# Patient Record
Sex: MALE | Race: WHITE | Employment: OTHER | URBAN - METROPOLITAN AREA
[De-identification: names, ages, dates, MRNs, and addresses within clinical notes are randomized per-mention and may not be internally consistent; named-entity substitution may affect disease eponyms.]

---

## 2018-03-29 ENCOUNTER — HOSPITAL ENCOUNTER (OUTPATIENT)
Dept: PHYSICAL THERAPY | Age: 63
Discharge: HOME | End: 2018-03-30
Admitting: FAMILY MEDICINE
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
## 2018-04-01 ENCOUNTER — HOSPITAL ENCOUNTER (OUTPATIENT)
Dept: OTHER | Age: 63
Discharge: HOME | End: 2018-04-01
Attending: FAMILY MEDICINE | Admitting: FAMILY MEDICINE

## 2018-04-05 ENCOUNTER — HOSPITAL ENCOUNTER (OUTPATIENT)
Dept: PHYSICAL THERAPY | Age: 63
Discharge: HOME | End: 2018-04-06

## 2018-04-10 ENCOUNTER — HOSPITAL ENCOUNTER (OUTPATIENT)
Dept: PHYSICAL THERAPY | Age: 63
Discharge: HOME | End: 2018-04-11

## 2018-04-12 ENCOUNTER — HOSPITAL ENCOUNTER (OUTPATIENT)
Dept: PHYSICAL THERAPY | Age: 63
Discharge: HOME | End: 2018-04-13

## 2018-04-17 ENCOUNTER — HOSPITAL ENCOUNTER (OUTPATIENT)
Dept: PHYSICAL THERAPY | Age: 63
Discharge: HOME | End: 2018-04-30

## 2018-04-24 ENCOUNTER — HOSPITAL ENCOUNTER (OUTPATIENT)
Dept: PHYSICAL THERAPY | Age: 63
Discharge: HOME | End: 2018-04-25

## 2018-04-27 ENCOUNTER — HOSPITAL ENCOUNTER (OUTPATIENT)
Dept: OTHER | Age: 63
Discharge: HOME | End: 2018-04-27
Attending: FAMILY MEDICINE | Admitting: FAMILY MEDICINE

## 2018-04-27 DIAGNOSIS — M54.2 CERVICALGIA: ICD-10-CM

## 2018-05-01 ENCOUNTER — HOSPITAL ENCOUNTER (OUTPATIENT)
Dept: OTHER | Age: 63
Discharge: HOME | End: 2018-05-14
Attending: FAMILY MEDICINE | Admitting: FAMILY MEDICINE

## 2018-05-10 ENCOUNTER — HOSPITAL ENCOUNTER (OUTPATIENT)
Dept: PHYSICAL THERAPY | Age: 63
Discharge: HOME | End: 2018-05-11

## 2018-09-27 ENCOUNTER — HOSPITAL ENCOUNTER (OUTPATIENT)
Age: 63
Discharge: HOME | End: 2018-09-27
Payer: MEDICAID

## 2018-09-27 ENCOUNTER — HOSPITAL ENCOUNTER (OUTPATIENT)
Dept: GENERAL RADIOLOGY | Age: 63
Discharge: HOME | End: 2018-09-27
Payer: MEDICAID

## 2018-09-27 DIAGNOSIS — J44.1 COPD EXACERBATION (HCC): ICD-10-CM

## 2018-09-27 PROCEDURE — 71046 X-RAY EXAM CHEST 2 VIEWS: CPT

## 2018-10-17 ENCOUNTER — OFFICE VISIT (OUTPATIENT)
Dept: ORTHOPEDIC SURGERY | Age: 63
End: 2018-10-17
Payer: MEDICAID

## 2018-10-17 VITALS
HEART RATE: 72 BPM | BODY MASS INDEX: 39.25 KG/M2 | WEIGHT: 264.99 LBS | DIASTOLIC BLOOD PRESSURE: 79 MMHG | SYSTOLIC BLOOD PRESSURE: 137 MMHG | HEIGHT: 69 IN

## 2018-10-17 DIAGNOSIS — M54.2 NECK PAIN: ICD-10-CM

## 2018-10-17 DIAGNOSIS — S16.1XXA STRAIN OF NECK MUSCLE, INITIAL ENCOUNTER: ICD-10-CM

## 2018-10-17 DIAGNOSIS — M54.12 CERVICAL RADICULOPATHY: Primary | ICD-10-CM

## 2018-10-17 PROCEDURE — 3017F COLORECTAL CA SCREEN DOC REV: CPT | Performed by: PHYSICAL MEDICINE & REHABILITATION

## 2018-10-17 PROCEDURE — G8427 DOCREV CUR MEDS BY ELIG CLIN: HCPCS | Performed by: PHYSICAL MEDICINE & REHABILITATION

## 2018-10-17 PROCEDURE — G8417 CALC BMI ABV UP PARAM F/U: HCPCS | Performed by: PHYSICAL MEDICINE & REHABILITATION

## 2018-10-17 PROCEDURE — 99204 OFFICE O/P NEW MOD 45 MIN: CPT | Performed by: PHYSICAL MEDICINE & REHABILITATION

## 2018-10-17 PROCEDURE — G8484 FLU IMMUNIZE NO ADMIN: HCPCS | Performed by: PHYSICAL MEDICINE & REHABILITATION

## 2018-10-17 PROCEDURE — 1036F TOBACCO NON-USER: CPT | Performed by: PHYSICAL MEDICINE & REHABILITATION

## 2018-10-19 ENCOUNTER — APPOINTMENT (OUTPATIENT)
Dept: GENERAL RADIOLOGY | Age: 63
End: 2018-10-19
Payer: MEDICAID

## 2018-10-19 ENCOUNTER — HOSPITAL ENCOUNTER (EMERGENCY)
Age: 63
Discharge: HOME | End: 2018-10-19
Attending: EMERGENCY MEDICINE
Payer: MEDICAID

## 2018-10-19 VITALS
DIASTOLIC BLOOD PRESSURE: 74 MMHG | RESPIRATION RATE: 15 BRPM | SYSTOLIC BLOOD PRESSURE: 157 MMHG | HEIGHT: 70 IN | BODY MASS INDEX: 39.37 KG/M2 | HEART RATE: 101 BPM | TEMPERATURE: 97.1 F | WEIGHT: 275 LBS | OXYGEN SATURATION: 100 %

## 2018-10-19 DIAGNOSIS — R05.9 COUGH: Primary | ICD-10-CM

## 2018-10-19 DIAGNOSIS — J44.1 COPD EXACERBATION (HCC): ICD-10-CM

## 2018-10-19 LAB
A/G RATIO: 1.3 (ref 1.1–2.2)
ALBUMIN SERPL-MCNC: 3.9 G/DL (ref 3.4–5)
ALP BLD-CCNC: 89 U/L (ref 40–129)
ALT SERPL-CCNC: 15 U/L (ref 10–40)
ANION GAP SERPL CALCULATED.3IONS-SCNC: 8 MMOL/L (ref 3–16)
AST SERPL-CCNC: 22 U/L (ref 15–37)
BASOPHILS ABSOLUTE: 0 K/UL (ref 0–0.2)
BASOPHILS RELATIVE PERCENT: 0.7 %
BILIRUB SERPL-MCNC: 0.3 MG/DL (ref 0–1)
BUN BLDV-MCNC: 13 MG/DL (ref 7–20)
CALCIUM SERPL-MCNC: 9 MG/DL (ref 8.3–10.6)
CHLORIDE BLD-SCNC: 100 MMOL/L (ref 99–110)
CO2: 29 MMOL/L (ref 21–32)
CREAT SERPL-MCNC: 0.8 MG/DL (ref 0.8–1.3)
D DIMER: <200 NG/ML DDU (ref 0–229)
EOSINOPHILS ABSOLUTE: 0.3 K/UL (ref 0–0.6)
EOSINOPHILS RELATIVE PERCENT: 5.9 %
GFR AFRICAN AMERICAN: >60
GFR NON-AFRICAN AMERICAN: >60
GLOBULIN: 3 G/DL
GLUCOSE BLD-MCNC: 76 MG/DL (ref 70–99)
HCT VFR BLD CALC: 42.2 % (ref 40.5–52.5)
HEMOGLOBIN: 14.6 G/DL (ref 13.5–17.5)
LYMPHOCYTES ABSOLUTE: 1.1 K/UL (ref 1–5.1)
LYMPHOCYTES RELATIVE PERCENT: 18.3 %
MCH RBC QN AUTO: 30.6 PG (ref 26–34)
MCHC RBC AUTO-ENTMCNC: 34.6 G/DL (ref 31–36)
MCV RBC AUTO: 88.4 FL (ref 80–100)
MONOCYTES ABSOLUTE: 0.4 K/UL (ref 0–1.3)
MONOCYTES RELATIVE PERCENT: 7.6 %
NEUTROPHILS ABSOLUTE: 3.9 K/UL (ref 1.7–7.7)
NEUTROPHILS RELATIVE PERCENT: 67.5 %
PDW BLD-RTO: 13.6 % (ref 12.4–15.4)
PLATELET # BLD: 172 K/UL (ref 135–450)
PMV BLD AUTO: 8.6 FL (ref 5–10.5)
POTASSIUM SERPL-SCNC: 4.2 MMOL/L (ref 3.5–5.1)
PRO-BNP: 153 PG/ML (ref 0–124)
RBC # BLD: 4.77 M/UL (ref 4.2–5.9)
SODIUM BLD-SCNC: 137 MMOL/L (ref 136–145)
TOTAL PROTEIN: 6.9 G/DL (ref 6.4–8.2)
TROPONIN: <0.01 NG/ML
WBC # BLD: 5.8 K/UL (ref 4–11)

## 2018-10-19 PROCEDURE — 71046 X-RAY EXAM CHEST 2 VIEWS: CPT

## 2018-10-19 PROCEDURE — 93005 ELECTROCARDIOGRAM TRACING: CPT | Performed by: NURSE PRACTITIONER

## 2018-10-19 PROCEDURE — 83880 ASSAY OF NATRIURETIC PEPTIDE: CPT

## 2018-10-19 PROCEDURE — 80053 COMPREHEN METABOLIC PANEL: CPT

## 2018-10-19 PROCEDURE — 84484 ASSAY OF TROPONIN QUANT: CPT

## 2018-10-19 PROCEDURE — 6360000002 HC RX W HCPCS: Performed by: NURSE PRACTITIONER

## 2018-10-19 PROCEDURE — 85025 COMPLETE CBC W/AUTO DIFF WBC: CPT

## 2018-10-19 PROCEDURE — 93010 ELECTROCARDIOGRAM REPORT: CPT | Performed by: INTERNAL MEDICINE

## 2018-10-19 PROCEDURE — 99285 EMERGENCY DEPT VISIT HI MDM: CPT

## 2018-10-19 PROCEDURE — 85379 FIBRIN DEGRADATION QUANT: CPT

## 2018-10-19 PROCEDURE — 6370000000 HC RX 637 (ALT 250 FOR IP): Performed by: NURSE PRACTITIONER

## 2018-10-19 RX ORDER — IPRATROPIUM BROMIDE AND ALBUTEROL SULFATE 2.5; .5 MG/3ML; MG/3ML
1 SOLUTION RESPIRATORY (INHALATION) ONCE
Status: COMPLETED | OUTPATIENT
Start: 2018-10-19 | End: 2018-10-19

## 2018-10-19 RX ORDER — ALBUTEROL SULFATE 2.5 MG/3ML
5 SOLUTION RESPIRATORY (INHALATION) ONCE
Status: COMPLETED | OUTPATIENT
Start: 2018-10-19 | End: 2018-10-19

## 2018-10-19 RX ORDER — PREDNISONE 20 MG/1
TABLET ORAL
Qty: 18 TABLET | Refills: 0 | Status: SHIPPED | OUTPATIENT
Start: 2018-10-19 | End: 2018-11-07 | Stop reason: ALTCHOICE

## 2018-10-19 RX ORDER — BENZONATATE 100 MG/1
100 CAPSULE ORAL 3 TIMES DAILY PRN
Qty: 30 CAPSULE | Refills: 0 | Status: SHIPPED | OUTPATIENT
Start: 2018-10-19 | End: 2018-10-26

## 2018-10-19 RX ORDER — PREDNISONE 20 MG/1
60 TABLET ORAL ONCE
Status: COMPLETED | OUTPATIENT
Start: 2018-10-19 | End: 2018-10-19

## 2018-10-19 RX ADMIN — ALBUTEROL SULFATE 5 MG: 2.5 SOLUTION RESPIRATORY (INHALATION) at 16:31

## 2018-10-19 RX ADMIN — IPRATROPIUM BROMIDE AND ALBUTEROL SULFATE 1 AMPULE: .5; 3 SOLUTION RESPIRATORY (INHALATION) at 15:48

## 2018-10-19 RX ADMIN — PREDNISONE 60 MG: 20 TABLET ORAL at 15:48

## 2018-10-19 ASSESSMENT — ENCOUNTER SYMPTOMS
SHORTNESS OF BREATH: 1
ABDOMINAL PAIN: 0
COUGH: 1

## 2018-10-19 NOTE — ED PROVIDER NOTES
[address] ED  eMERGENCY dEPARTMENT eNCOUnter        Pt Name: Rachel Bravo  MRN: [id number]  [place] 1955  Date of evaluation: 10/19/2018  Provider: AKILA Pruitt CNP  PCP: AKILA Joe CNP  ED Attendin[address] [place] [address]       Chief Complaint   Patient presents with    Shortness of Breath     Pt presents with c/o shortness of breath that has gotten progressively worse over 2-3 days. Notes hx of COPD. Due to see pulmonologist on . Notes cough x 1 month. Denies chest pain. Note inhaler is not helping sob. HISTORY OF PRESENT ILLNESS   (Location/Symptom, Timing/Onset, Context/Setting, Quality, Duration, Modifying Factors, Severity)  Note limiting factors. Rachel Bravo is a 59 y.o. male for Shortness of breath. Onset was the last few months. Durationhas been progressively getting worse over the last few days. Context includes patient reports that he has had a cough for the last month. He reports that he's had increasing shortness of breath over the last few days. He is scheduled to see pulmonology, Dr. Kei Doe on 2018. Alleviating factors include nothing. Aggravating factors include nothing. Pain is 0/10. Nothing has been used for pain today. Nursing Notes were all reviewed and agreed with or any disagreements were addressed  in the HPI. REVIEW OF SYSTEMS  (2-9 systems for level 4, 10 or more for level 5)     Review of Systems   Constitutional: Negative for fever. Respiratory: Positive for cough and shortness of breath. Cardiovascular: Negative for chest pain. Gastrointestinal: Negative for abdominal pain. Genitourinary: Negative for difficulty urinating. All other systems reviewed and are negative. Positivesand Pertinent negatives as per HPI. Except as noted above in the ROS, all other systems were reviewed and negative.        PAST MEDICAL HISTORY     Past Medical History:   Diagnosis Date BP: (!) 153/85  (!) 150/72 (!) 157/74   Pulse: 69 69 69 101   Resp: 16 17 19 15   Temp:       TempSrc:       SpO2: 98% 100% 100% 100%   Weight:       Height:           Patient was given the following medications:  Medications   ipratropium-albuterol (DUONEB) nebulizer solution 1 ampule (1 ampule Inhalation Given 10/19/18 1548)   predniSONE (DELTASONE) tablet 60 mg (60 mg Oral Given 10/19/18 1548)   albuterol (PROVENTIL) nebulizer solution 5 mg (5 mg Nebulization Given 10/19/18 1631)       Patient was seen and evaluated by myself. Patient here for complaints of shortness of breath. He also states he's had a cough. On exam he is awake and alert hemodynamically stable nontoxic in appearance. Patient does report that he has a history of COPD and uses an inhaler at home. Patient reports he is not on any antibiotics or steroids currently. He does have an appointment scheduled with the pulmonologist in the month. Lab values available been reviewed and interpreted. Patient's chest x-ray was stable for fibrosis and atelectasis with no acute congestive heart failure. His troponin was negative as was d-dimer. Patient was given breathing treatments and steroids in the ED. He was able to ambulate to the restroom without difficulty. Patient will be discharged home with prednisone and encouraged to continue using his albuterol. He was encouraged to continue with his appointment scheduled for next month with the pulmonologist.  I did discuss the case with Dr. Slime Velasquez  however he did not to the patient he was in agreement with the plan. Patient was encouraged follow-up with his primary care doctor in the next few days. Patient was discharged home with all questions answered. The patient tolerated their visit well. They were seen and evaluated by the Bhavik Mesa who agreed with the assessment and plan.   The patient and / or the family were informed of the results of any tests, a time was given to answer questions, a plan was proposed and they agreed withplan. FINAL IMPRESSION      1. Cough    2. COPD exacerbation [facility])          DISPOSITION/PLAN   DISPOSITION Decision To Discharge 10/19/2018 05:17:51 PM      PATIENT REFERRED TO:  AKILA Greenwood CNP  [address]  [phone]    Schedule an appointment as soon as possible for a visit   for re-evaluation    [facility]  [address] [facility]  [phone]    If symptoms worsen    keep your appointment with Dr Miko Warner:  New Prescriptions    BENZONATATE (TESSALON PERLES) 100 MG CAPSULE    Take 1 capsule by mouth 3 times daily as needed for Cough    PREDNISONE (DELTASONE) 20 MG TABLET    Take 3 tabs orally daily for 3 days, then take 2 tabs orally for 3 days then take 1 tab orally for 3 days.        DISCONTINUED MEDICATIONS:  Discontinued Medications    No medications on file              (Please note that portions of this note were completed with a voice recognition program.  Efforts were made to edit the dictations but occasionally words are mis-transcribed.)    AKILA Ruiz CNP (electronically signed)     AKILA Ruiz CNP  10/19/18 4820

## 2018-10-22 LAB
EKG ATRIAL RATE: 82 BPM
EKG DIAGNOSIS: NORMAL
EKG P AXIS: 54 DEGREES
EKG P-R INTERVAL: 146 MS
EKG Q-T INTERVAL: 360 MS
EKG QRS DURATION: 102 MS
EKG QTC CALCULATION (BAZETT): 420 MS
EKG R AXIS: -25 DEGREES
EKG T AXIS: 64 DEGREES
EKG VENTRICULAR RATE: 82 BPM

## 2018-11-07 ENCOUNTER — OFFICE VISIT (OUTPATIENT)
Dept: PULMONOLOGY | Age: 63
End: 2018-11-07
Payer: MEDICAID

## 2018-11-07 VITALS
WEIGHT: 278 LBS | TEMPERATURE: 98.3 F | BODY MASS INDEX: 38.92 KG/M2 | HEIGHT: 71 IN | OXYGEN SATURATION: 97 % | RESPIRATION RATE: 16 BRPM | HEART RATE: 66 BPM | SYSTOLIC BLOOD PRESSURE: 122 MMHG | DIASTOLIC BLOOD PRESSURE: 70 MMHG

## 2018-11-07 DIAGNOSIS — G47.10 HYPERSOMNIA: ICD-10-CM

## 2018-11-07 DIAGNOSIS — R06.02 SOB (SHORTNESS OF BREATH): Primary | ICD-10-CM

## 2018-11-07 DIAGNOSIS — E66.01 MORBID OBESITY WITH BMI OF 40.0-44.9, ADULT (HCC): ICD-10-CM

## 2018-11-07 PROCEDURE — 99245 OFF/OP CONSLTJ NEW/EST HI 55: CPT | Performed by: INTERNAL MEDICINE

## 2018-11-07 PROCEDURE — G8417 CALC BMI ABV UP PARAM F/U: HCPCS | Performed by: INTERNAL MEDICINE

## 2018-11-07 PROCEDURE — G8484 FLU IMMUNIZE NO ADMIN: HCPCS | Performed by: INTERNAL MEDICINE

## 2018-11-07 PROCEDURE — 3017F COLORECTAL CA SCREEN DOC REV: CPT | Performed by: INTERNAL MEDICINE

## 2018-11-07 PROCEDURE — G8428 CUR MEDS NOT DOCUMENT: HCPCS | Performed by: INTERNAL MEDICINE

## 2018-11-07 NOTE — PROGRESS NOTES
Chief Complaint: COPD    Consulting provider: AKILA Holden CNP    HPI: 59 y.o. male patient is being seen at the request of AKILA Holden CNP for a consultation regarding COPD. He does not recall prior testing. He was dx 2 years ago and in the last 6 months the SOB and wheezing have been worse. He finds his rescue inhaler less effective. The patient does not have SOB at rest but has DIAL. Exercise tolerance on level ground is 1-2 blocks. The patient has a  daily intermittent cough that is usually dry. The patient does wheeze intermittently. Recent ED visit for COPD AE. Never hospitalized. He snores and halls asleep very easily during the day. The patient has smoked 1-1.5 PPD for 10 years. Quit in 2001  Environmental/chemical exposure: Asbestos exposure: no  Patient worked with computers. The information above included the review and summarization of old records. The history was obtained from these old records and from the patient directly. Outside information from Ms Boy Ferris NP regarding COPD was reviewed. He has a rescue inhaler. Has not started Symbicort yet. REVIEW OF SYSTEMS:    See HPI and scanned Review of Systems sheet. Past Medical History:   Diagnosis Date    [facility] disease) ([facility] [address]     Histoplasmosis     2000    HLD (hyperlipidemia)     Hypertension      Past Surgical History      Procedure Laterality Date    EYE MUSCLE SURGERY      as child    OTHER SURGICAL HISTORY      PE tubes bilat.  UPPER GASTROINTESTINAL ENDOSCOPY      Approx 9 years ago    UPPER GASTROINTESTINAL ENDOSCOPY  04/29/2012    foreign body     Social History:    TOBACCO:   reports that he quit smoking about 17 years ago. His smoking use included Cigarettes. He has a 30.00 pack-year smoking history. He has never used smokeless tobacco.  ETOH:   reports that he does not drink alcohol. Family History  History reviewed. No pertinent family history.   Allergies:  is allergic to

## 2018-11-19 ENCOUNTER — OFFICE VISIT (OUTPATIENT)
Dept: PULMONOLOGY | Age: 63
End: 2018-11-19
Payer: MEDICAID

## 2018-11-19 VITALS
OXYGEN SATURATION: 96 % | SYSTOLIC BLOOD PRESSURE: 148 MMHG | RESPIRATION RATE: 18 BRPM | BODY MASS INDEX: 38.78 KG/M2 | WEIGHT: 277 LBS | HEIGHT: 71 IN | HEART RATE: 76 BPM | DIASTOLIC BLOOD PRESSURE: 84 MMHG

## 2018-11-19 DIAGNOSIS — R53.83 FATIGUE, UNSPECIFIED TYPE: ICD-10-CM

## 2018-11-19 DIAGNOSIS — G47.10 HYPERSOMNIA: ICD-10-CM

## 2018-11-19 DIAGNOSIS — R29.818 SUSPECTED SLEEP APNEA: ICD-10-CM

## 2018-11-19 DIAGNOSIS — R06.83 SNORING: Primary | ICD-10-CM

## 2018-11-19 PROCEDURE — 99213 OFFICE O/P EST LOW 20 MIN: CPT | Performed by: NURSE PRACTITIONER

## 2018-11-19 PROCEDURE — 1036F TOBACCO NON-USER: CPT | Performed by: NURSE PRACTITIONER

## 2018-11-19 PROCEDURE — G8484 FLU IMMUNIZE NO ADMIN: HCPCS | Performed by: NURSE PRACTITIONER

## 2018-11-19 PROCEDURE — G8417 CALC BMI ABV UP PARAM F/U: HCPCS | Performed by: NURSE PRACTITIONER

## 2018-11-19 PROCEDURE — 3017F COLORECTAL CA SCREEN DOC REV: CPT | Performed by: NURSE PRACTITIONER

## 2018-11-19 PROCEDURE — G8427 DOCREV CUR MEDS BY ELIG CLIN: HCPCS | Performed by: NURSE PRACTITIONER

## 2018-11-19 ASSESSMENT — SLEEP AND FATIGUE QUESTIONNAIRES
HOW LIKELY ARE YOU TO NOD OFF OR FALL ASLEEP WHILE SITTING AND TALKING TO SOMEONE: 2
HOW LIKELY ARE YOU TO NOD OFF OR FALL ASLEEP WHILE LYING DOWN TO REST IN THE AFTERNOON WHEN CIRCUMSTANCES PERMIT: 2
HOW LIKELY ARE YOU TO NOD OFF OR FALL ASLEEP WHEN YOU ARE A PASSENGER IN A CAR FOR AN HOUR WITHOUT A BREAK: 3
HOW LIKELY ARE YOU TO NOD OFF OR FALL ASLEEP WHILE SITTING QUIETLY AFTER LUNCH WITHOUT ALCOHOL: 3
HOW LIKELY ARE YOU TO NOD OFF OR FALL ASLEEP WHILE SITTING INACTIVE IN A PUBLIC PLACE: 2
HOW LIKELY ARE YOU TO NOD OFF OR FALL ASLEEP WHILE SITTING AND READING: 3
HOW LIKELY ARE YOU TO NOD OFF OR FALL ASLEEP IN A CAR, WHILE STOPPED FOR A FEW MINUTES IN TRAFFIC: 2
NECK CIRCUMFERENCE (INCHES): 18.5
HOW LIKELY ARE YOU TO NOD OFF OR FALL ASLEEP WHILE WATCHING TV: 3
ESS TOTAL SCORE: 20

## 2018-11-19 NOTE — PATIENT INSTRUCTIONS
Please keep all of your future appointments scheduled by [address], [facility] Pulmonary office. Sleep Hygiene. .. Tips for better sleep. .. Avoid naps. This will ensure you are sleepy at bedtime. If you have to take a nap, sleep less than 1 hour, before 3 pm.  Sleep only when sleepy; this reduces the time you are awake in bed. Regular exercise is recommended to help you deepen your sleep, but not within 4-6 hours of your bedtime. Timing of exercise is important, aim to exercise early in the morning or early afternoon. A light snack may help you fall asleep. Warm milk and foods high in the amino acid tryptophan, such as bananas, may help you to sleep  Be sure to avoid heavy, spicy or sugary foods 4-6 hours before bedtime and avoid at snack time. Stay away from stimulants such as caffeine and nicotine for at least 4-6 hours before bed. Stimulants can interfere with your ability to fall asleep. Caffeine is found in tea, cola, coffee, cocoa and chocolate and is best avoided at bedtime. Nicotine is found in tobacco products. Avoid alcohol 4-6 hours before bedtime. Alcohol has an immediate sleep-inducing effect, after a few hours when alcohol levels fall there is a stimulant or wake-up effect and will cause fragmented sleep. Sleep rituals are important. Give your body clues it is time to slow down and sleep. Examples include; yoga, deep breathing, listen to relaxing music, a hot bath or a few minutes of reading. Have a fixed bedtime and awakening time, Even on weekends! You will feel better keeping a regular sleep cycle, even if you are retired or not working. Get into your favorite sleep position. If not asleep in 30 minutes, get up and do something boring until you feel sleepy. Remember not to expose yourself to bright lights such as TV, phone or tablet screens. Only use your bed for sleeping. Do not use your bed as an office, workroom or recreation room. Use comfortable bedding. [phone] or  [phone] [address], [address] [place] [address]  ([address]) [address], [address]   [place] [address] [phone] opt4 opt2 [phone]    [facility] [phone] [address], [address] [facility]   [place]  ([address]) [phone] or  [phone] [address], [address]

## 2018-11-19 NOTE — PROGRESS NOTES
[facility]                                                                  CHIEF COMPLAINT: snoring, excessive daytime sleepiness     Consulting provider: Dr. Mishel Mackay       HPI:   Snoring at night for the past 5+ years. The severity of snoring is loud and severe. Snoring is interrupting sleep of others. Wakes self snoring. Has witnessed apnea. Wakes up at night choking and gasping for air. No restorative sleep. No dry mouth upon awakening. Fatigue and tiredness during the day. Bedtime 8-10 pm and rise time is 5-6 am. Sleep onset<5 minutes. No TV in bedroom. 0-1 nocturia. Wakes up 2-3 times at night. It takes him 15 minutes to fall back a sleep. +unintentional naps during the day, dozes off and on multiple times throughout the day 2-8 times, on average for 15-20 minutes. No headache in am. No car wrecks or near wrecks because of the sleepiness. No nodding off while driving. +weight gain of 25 pounds over the last year. +forgetfulness or decreased concentration. +nasal congestion at night. 3-4 (16oz) caffinated beverages per day. No alcohol. +restless feelings in legs at night. ESS is 20. No smoking. No FH for ELPIDIO (+son on CPAP), RLS or narcolepsy.      Sleep Medicine 11/19/2018   Sitting and reading 3   Watching TV 3   Sitting, inactive in a public place (e.g. a theatre or a meeting) 2   As a passenger in a car for an hour without a break 3   Lying down to rest in the afternoon when circumstances permit 2   Sitting and talking to someone 2   Sitting quietly after a lunch without alcohol 3   In a car, while stopped for a few minutes in traffic 2   Total score 20   Neck circumference 18.5       Past Medical History:   Diagnosis Date    [facility] obstructive [facility] disease) ([facility] [address]     Histoplasmosis     2000    HLD (hyperlipidemia)     Hypertension        Past Surgical History:        Procedure Laterality Date    EYE MUSCLE SURGERY      as child    OTHER SURGICAL

## 2018-11-21 ENCOUNTER — HOSPITAL ENCOUNTER (OUTPATIENT)
Dept: PULMONOLOGY | Age: 63
Discharge: HOME | End: 2018-11-21
Payer: MEDICAID

## 2018-11-21 ENCOUNTER — TELEPHONE (OUTPATIENT)
Dept: PULMONOLOGY | Age: 63
End: 2018-11-21

## 2018-11-21 ENCOUNTER — OFFICE VISIT (OUTPATIENT)
Dept: PULMONOLOGY | Age: 63
End: 2018-11-21
Payer: MEDICAID

## 2018-11-21 VITALS
RESPIRATION RATE: 20 BRPM | HEART RATE: 82 BPM | HEIGHT: 70 IN | TEMPERATURE: 97.9 F | WEIGHT: 280 LBS | OXYGEN SATURATION: 95 % | DIASTOLIC BLOOD PRESSURE: 80 MMHG | BODY MASS INDEX: 40.09 KG/M2 | SYSTOLIC BLOOD PRESSURE: 142 MMHG

## 2018-11-21 DIAGNOSIS — J44.9 MODERATE COPD (CHRONIC OBSTRUCTIVE PULMONARY DISEASE) (HCC): Primary | ICD-10-CM

## 2018-11-21 DIAGNOSIS — R06.02 SOB (SHORTNESS OF BREATH): ICD-10-CM

## 2018-11-21 DIAGNOSIS — R29.818 SUSPECTED SLEEP APNEA: ICD-10-CM

## 2018-11-21 DIAGNOSIS — E66.01 MORBID OBESITY WITH BMI OF 40.0-44.9, ADULT (HCC): ICD-10-CM

## 2018-11-21 PROCEDURE — 94726 PLETHYSMOGRAPHY LUNG VOLUMES: CPT

## 2018-11-21 PROCEDURE — 99214 OFFICE O/P EST MOD 30 MIN: CPT | Performed by: INTERNAL MEDICINE

## 2018-11-21 PROCEDURE — 1036F TOBACCO NON-USER: CPT | Performed by: INTERNAL MEDICINE

## 2018-11-21 PROCEDURE — 94618 PULMONARY STRESS TESTING: CPT

## 2018-11-21 PROCEDURE — G8427 DOCREV CUR MEDS BY ELIG CLIN: HCPCS | Performed by: INTERNAL MEDICINE

## 2018-11-21 PROCEDURE — G8926 SPIRO NO PERF OR DOC: HCPCS | Performed by: INTERNAL MEDICINE

## 2018-11-21 PROCEDURE — 3023F SPIROM DOC REV: CPT | Performed by: INTERNAL MEDICINE

## 2018-11-21 PROCEDURE — 3017F COLORECTAL CA SCREEN DOC REV: CPT | Performed by: INTERNAL MEDICINE

## 2018-11-21 PROCEDURE — G8484 FLU IMMUNIZE NO ADMIN: HCPCS | Performed by: INTERNAL MEDICINE

## 2018-11-21 PROCEDURE — 94060 EVALUATION OF WHEEZING: CPT

## 2018-11-21 PROCEDURE — 94640 AIRWAY INHALATION TREATMENT: CPT

## 2018-11-21 PROCEDURE — G8417 CALC BMI ABV UP PARAM F/U: HCPCS | Performed by: INTERNAL MEDICINE

## 2018-11-21 PROCEDURE — 6360000002 HC RX W HCPCS: Performed by: INTERNAL MEDICINE

## 2018-11-21 PROCEDURE — 94729 DIFFUSING CAPACITY: CPT

## 2018-11-21 PROCEDURE — 94664 DEMO&/EVAL PT USE INHALER: CPT

## 2018-11-21 RX ORDER — ALBUTEROL SULFATE 2.5 MG/3ML
2.5 SOLUTION RESPIRATORY (INHALATION) ONCE
Status: COMPLETED | OUTPATIENT
Start: 2018-11-21 | End: 2018-11-21

## 2018-11-21 RX ORDER — ALBUTEROL SULFATE 2.5 MG/3ML
2.5 SOLUTION RESPIRATORY (INHALATION) EVERY 6 HOURS PRN
Qty: 125 EACH | Refills: 5 | Status: SHIPPED | OUTPATIENT
Start: 2018-11-21 | End: 2020-07-21 | Stop reason: SDUPTHER

## 2018-11-21 RX ADMIN — ALBUTEROL SULFATE 2.5 MG: 2.5 SOLUTION RESPIRATORY (INHALATION) at 09:19

## 2018-12-17 ENCOUNTER — HOSPITAL ENCOUNTER (OUTPATIENT)
Dept: SLEEP CENTER | Age: 63
Discharge: HOME | End: 2018-12-19
Payer: MEDICAID

## 2018-12-17 DIAGNOSIS — R53.83 FATIGUE, UNSPECIFIED TYPE: ICD-10-CM

## 2018-12-17 DIAGNOSIS — R06.83 SNORING: ICD-10-CM

## 2018-12-17 DIAGNOSIS — G47.10 HYPERSOMNIA: ICD-10-CM

## 2018-12-17 DIAGNOSIS — R29.818 SUSPECTED SLEEP APNEA: ICD-10-CM

## 2018-12-17 PROCEDURE — 95811 POLYSOM 6/>YRS CPAP 4/> PARM: CPT

## 2018-12-18 ENCOUNTER — TELEPHONE (OUTPATIENT)
Dept: PULMONOLOGY | Age: 63
End: 2018-12-18

## 2018-12-18 DIAGNOSIS — G47.33 OSA (OBSTRUCTIVE SLEEP APNEA): Primary | ICD-10-CM

## 2019-01-02 ENCOUNTER — OFFICE VISIT (OUTPATIENT)
Dept: ORTHOPEDIC SURGERY | Age: 64
End: 2019-01-02
Payer: MEDICAID

## 2019-01-02 VITALS
HEART RATE: 71 BPM | SYSTOLIC BLOOD PRESSURE: 158 MMHG | BODY MASS INDEX: 40.08 KG/M2 | DIASTOLIC BLOOD PRESSURE: 83 MMHG | WEIGHT: 279.98 LBS | HEIGHT: 70 IN

## 2019-01-02 DIAGNOSIS — S16.1XXA STRAIN OF NECK MUSCLE, INITIAL ENCOUNTER: ICD-10-CM

## 2019-01-02 DIAGNOSIS — M54.12 CERVICAL RADICULOPATHY: Primary | ICD-10-CM

## 2019-01-02 DIAGNOSIS — M54.2 NECK PAIN: ICD-10-CM

## 2019-01-02 PROCEDURE — G8417 CALC BMI ABV UP PARAM F/U: HCPCS | Performed by: PHYSICAL MEDICINE & REHABILITATION

## 2019-01-02 PROCEDURE — G8484 FLU IMMUNIZE NO ADMIN: HCPCS | Performed by: PHYSICAL MEDICINE & REHABILITATION

## 2019-01-02 PROCEDURE — 99213 OFFICE O/P EST LOW 20 MIN: CPT | Performed by: PHYSICAL MEDICINE & REHABILITATION

## 2019-01-02 PROCEDURE — 3017F COLORECTAL CA SCREEN DOC REV: CPT | Performed by: PHYSICAL MEDICINE & REHABILITATION

## 2019-01-02 PROCEDURE — 1036F TOBACCO NON-USER: CPT | Performed by: PHYSICAL MEDICINE & REHABILITATION

## 2019-01-02 PROCEDURE — G8427 DOCREV CUR MEDS BY ELIG CLIN: HCPCS | Performed by: PHYSICAL MEDICINE & REHABILITATION

## 2019-01-02 RX ORDER — GABAPENTIN 300 MG/1
300 CAPSULE ORAL 2 TIMES DAILY PRN
Qty: 60 CAPSULE | Refills: 2 | Status: SHIPPED | OUTPATIENT
Start: 2019-01-02 | End: 2020-01-21

## 2019-01-16 ENCOUNTER — HOSPITAL ENCOUNTER (OUTPATIENT)
Dept: VASCULAR LAB | Age: 64
Discharge: HOME | End: 2019-01-16
Payer: MEDICAID

## 2019-01-16 PROCEDURE — 93971 EXTREMITY STUDY: CPT

## 2019-01-28 ENCOUNTER — HOSPITAL ENCOUNTER (OUTPATIENT)
Dept: CARDIAC REHAB | Age: 64
Setting detail: THERAPIES SERIES
Discharge: HOME | End: 2019-01-28
Payer: MEDICAID

## 2019-01-28 PROCEDURE — G0424 PULMONARY REHAB W EXER: HCPCS

## 2019-02-04 ENCOUNTER — HOSPITAL ENCOUNTER (OUTPATIENT)
Dept: CARDIAC REHAB | Age: 64
Setting detail: THERAPIES SERIES
Discharge: HOME | End: 2019-02-04
Payer: MEDICAID

## 2019-02-04 PROCEDURE — G0424 PULMONARY REHAB W EXER: HCPCS

## 2019-02-15 ENCOUNTER — TELEPHONE (OUTPATIENT)
Dept: ORTHOPEDIC SURGERY | Age: 64
End: 2019-02-15

## 2019-03-04 ENCOUNTER — OFFICE VISIT (OUTPATIENT)
Dept: PULMONOLOGY | Age: 64
End: 2019-03-04
Payer: MEDICAID

## 2019-03-04 VITALS
TEMPERATURE: 97.9 F | DIASTOLIC BLOOD PRESSURE: 80 MMHG | WEIGHT: 278 LBS | RESPIRATION RATE: 16 BRPM | HEART RATE: 86 BPM | OXYGEN SATURATION: 96 % | BODY MASS INDEX: 39.8 KG/M2 | SYSTOLIC BLOOD PRESSURE: 134 MMHG | HEIGHT: 70 IN

## 2019-03-04 DIAGNOSIS — E66.9 OBESITY (BMI 30-39.9): ICD-10-CM

## 2019-03-04 DIAGNOSIS — Z71.89 ENCOUNTER FOR BIPAP USE COUNSELING: ICD-10-CM

## 2019-03-04 DIAGNOSIS — G47.33 SEVERE OBSTRUCTIVE SLEEP APNEA: Primary | ICD-10-CM

## 2019-03-04 DIAGNOSIS — Z72.821 POOR SLEEP HYGIENE: ICD-10-CM

## 2019-03-04 PROCEDURE — G8427 DOCREV CUR MEDS BY ELIG CLIN: HCPCS | Performed by: NURSE PRACTITIONER

## 2019-03-04 PROCEDURE — 3017F COLORECTAL CA SCREEN DOC REV: CPT | Performed by: NURSE PRACTITIONER

## 2019-03-04 PROCEDURE — 1036F TOBACCO NON-USER: CPT | Performed by: NURSE PRACTITIONER

## 2019-03-04 PROCEDURE — G8484 FLU IMMUNIZE NO ADMIN: HCPCS | Performed by: NURSE PRACTITIONER

## 2019-03-04 PROCEDURE — 99213 OFFICE O/P EST LOW 20 MIN: CPT | Performed by: NURSE PRACTITIONER

## 2019-03-04 PROCEDURE — G8417 CALC BMI ABV UP PARAM F/U: HCPCS | Performed by: NURSE PRACTITIONER

## 2019-03-04 ASSESSMENT — SLEEP AND FATIGUE QUESTIONNAIRES
HOW LIKELY ARE YOU TO NOD OFF OR FALL ASLEEP WHILE SITTING AND READING: 0
HOW LIKELY ARE YOU TO NOD OFF OR FALL ASLEEP WHILE SITTING AND TALKING TO SOMEONE: 0
HOW LIKELY ARE YOU TO NOD OFF OR FALL ASLEEP WHILE WATCHING TV: 1
HOW LIKELY ARE YOU TO NOD OFF OR FALL ASLEEP WHILE SITTING QUIETLY AFTER LUNCH WITHOUT ALCOHOL: 2
HOW LIKELY ARE YOU TO NOD OFF OR FALL ASLEEP WHILE SITTING INACTIVE IN A PUBLIC PLACE: 1
HOW LIKELY ARE YOU TO NOD OFF OR FALL ASLEEP IN A CAR, WHILE STOPPED FOR A FEW MINUTES IN TRAFFIC: 1
ESS TOTAL SCORE: 8
HOW LIKELY ARE YOU TO NOD OFF OR FALL ASLEEP WHEN YOU ARE A PASSENGER IN A CAR FOR AN HOUR WITHOUT A BREAK: 1
NECK CIRCUMFERENCE (INCHES): 18.5
HOW LIKELY ARE YOU TO NOD OFF OR FALL ASLEEP WHILE LYING DOWN TO REST IN THE AFTERNOON WHEN CIRCUMSTANCES PERMIT: 2

## 2019-05-21 ENCOUNTER — OFFICE VISIT (OUTPATIENT)
Dept: PULMONOLOGY | Age: 64
End: 2019-05-21
Payer: MEDICAID

## 2019-05-21 VITALS
SYSTOLIC BLOOD PRESSURE: 122 MMHG | RESPIRATION RATE: 16 BRPM | HEART RATE: 66 BPM | DIASTOLIC BLOOD PRESSURE: 74 MMHG | HEIGHT: 70 IN | TEMPERATURE: 98.2 F | WEIGHT: 276 LBS | OXYGEN SATURATION: 98 % | BODY MASS INDEX: 39.51 KG/M2

## 2019-05-21 DIAGNOSIS — G47.33 OSA (OBSTRUCTIVE SLEEP APNEA): ICD-10-CM

## 2019-05-21 DIAGNOSIS — E66.9 OBESITY (BMI 30-39.9): ICD-10-CM

## 2019-05-21 DIAGNOSIS — G47.33 SEVERE OBSTRUCTIVE SLEEP APNEA: Primary | ICD-10-CM

## 2019-05-21 PROCEDURE — 3017F COLORECTAL CA SCREEN DOC REV: CPT | Performed by: INTERNAL MEDICINE

## 2019-05-21 PROCEDURE — G8427 DOCREV CUR MEDS BY ELIG CLIN: HCPCS | Performed by: INTERNAL MEDICINE

## 2019-05-21 PROCEDURE — G8417 CALC BMI ABV UP PARAM F/U: HCPCS | Performed by: INTERNAL MEDICINE

## 2019-05-21 PROCEDURE — 99214 OFFICE O/P EST MOD 30 MIN: CPT | Performed by: INTERNAL MEDICINE

## 2019-05-21 PROCEDURE — 1036F TOBACCO NON-USER: CPT | Performed by: INTERNAL MEDICINE

## 2019-05-21 NOTE — PROGRESS NOTES
mouth daily, Disp: 30 tablet, Rfl: 5    ibuprofen (ADVIL;MOTRIN) 200 MG tablet, Take 3 tablets by mouth every 8 hours as needed for Pain, Disp: 30 tablet, Rfl: 3    acetaminophen (TYLENOL) 325 MG tablet, Take 2 tablets by mouth every 6 hours as needed for Pain, Disp: 60 tablet, Rfl: 3    aspirin EC 81 MG EC tablet, Take 1 tablet by mouth daily, Disp: 30 tablet, Rfl: 2    gabapentin (NEURONTIN) 300 MG capsule, Take 1 capsule by mouth 2 times daily as needed (nerve pain) for up to 30 days. ., Disp: 60 capsule, Rfl: 2    naproxen (NAPROSYN) 500 MG tablet, Take 1 tablet by mouth 2 times daily (with meals) for 5 days, Disp: 10 tablet, Rfl: 0    Objective:   PHYSICAL EXAM: /74 (Site: Right Upper Arm, Position: Sitting)   Pulse 66   Temp 98.2 °F (36.8 °C) (Oral)   Resp 16   Ht 5' 10\" (1.778 m)   Wt 276 lb (125.2 kg)   SpO2 98%   BMI 39.60 kg/m²    Constitutional:  No acute distress. Eyes: PERRL. Conjunctivae anicteric. ENT: Normal nose. Normal tongue. Oropharynx clear. Neck:  Trachea is midline. No thyroid tenderness. Respiratory: No accessory muscle usage. decreased breath sounds. No wheezes. No rales. No Rhonchi. Cardiovascular: Normal S1S2. No digit clubbing. No digit cyanosis. No LE edema. Psychiatric: No anxiety or Agitation. Alert and Oriented to person, place and time. LABS:  Reviewed any pertinent new labs that are available.     PFTs 11/21/18  FVC  (64%) FEV1 1.88 (53%) FEV1/FVC ratio 0.62  TLC  (71%)  RV  (%)   DLCO (78%) Bronchodilator response: no    6MWT: 1000ft, 96%    IMAGING:  I personally reviewed and interpreted the following today in the office:   CXR:10/19/18   There is stable fine linear opacity noted in the right mid lung.  Linear   streak like opacities are in the left base.  Bronchovascular markings are   chronically prominent.  No consolidation has developed         ASSESSMENT:  · Moderately Severe COPD  · Morbid Obesity  · Severe ELPIDIO on BiPAP 19/14 cm H2O  · H/o Pulmonary Histoplasmosis dx by tyson in 2001     PLAN:   · Continue Spiriva Handihaler daily  · Pulmonary rehab declined due to travel and hip pain.    · Continue PRN Ventolin  · Sleep clinic ascheduled; Bipap 19/14  · F/u in 8 months

## 2020-01-21 ENCOUNTER — OFFICE VISIT (OUTPATIENT)
Dept: PULMONOLOGY | Age: 65
End: 2020-01-21
Payer: MEDICAID

## 2020-01-21 VITALS
HEIGHT: 69 IN | BODY MASS INDEX: 34.57 KG/M2 | HEART RATE: 69 BPM | WEIGHT: 233.4 LBS | DIASTOLIC BLOOD PRESSURE: 85 MMHG | RESPIRATION RATE: 20 BRPM | OXYGEN SATURATION: 98 % | SYSTOLIC BLOOD PRESSURE: 143 MMHG | TEMPERATURE: 98.1 F

## 2020-01-21 PROBLEM — B39.2 PULMONARY HISTOPLASMOSIS GRANULOMA (HCC): Status: ACTIVE | Noted: 2020-01-21

## 2020-01-21 PROCEDURE — G8427 DOCREV CUR MEDS BY ELIG CLIN: HCPCS | Performed by: INTERNAL MEDICINE

## 2020-01-21 PROCEDURE — G8482 FLU IMMUNIZE ORDER/ADMIN: HCPCS | Performed by: INTERNAL MEDICINE

## 2020-01-21 PROCEDURE — 3017F COLORECTAL CA SCREEN DOC REV: CPT | Performed by: INTERNAL MEDICINE

## 2020-01-21 PROCEDURE — 99214 OFFICE O/P EST MOD 30 MIN: CPT | Performed by: INTERNAL MEDICINE

## 2020-01-21 PROCEDURE — 1036F TOBACCO NON-USER: CPT | Performed by: INTERNAL MEDICINE

## 2020-01-21 PROCEDURE — G8417 CALC BMI ABV UP PARAM F/U: HCPCS | Performed by: INTERNAL MEDICINE

## 2020-01-21 NOTE — PROGRESS NOTES
(TYLENOL) 325 MG tablet, Take 2 tablets by mouth every 6 hours as needed for Pain, Disp: 60 tablet, Rfl: 3    aspirin EC 81 MG EC tablet, Take 1 tablet by mouth daily, Disp: 30 tablet, Rfl: 2    naproxen (NAPROSYN) 500 MG tablet, Take 1 tablet by mouth 2 times daily (with meals) for 5 days, Disp: 10 tablet, Rfl: 0    Objective:   PHYSICAL EXAM: BP (!) 143/85   Pulse 69   Temp 98.1 °F (36.7 °C) (Oral)   Resp 20   Ht 5' 9\" (1.753 m)   Wt 233 lb 6.4 oz (105.9 kg)   SpO2 98% Comment: RA  BMI 34.47 kg/m²    Constitutional:  No acute distress. Eyes: PERRL. Conjunctivae anicteric. ENT: Normal nose. Normal tongue. Oropharynx clear. Neck:  Trachea is midline. No thyroid tenderness. Respiratory: No accessory muscle usage. decreased breath sounds. No wheezes. No rales. No Rhonchi. Cardiovascular: Normal S1S2. No digit clubbing. No digit cyanosis. No LE edema. Psychiatric: No anxiety or Agitation. Alert and Oriented to person, place and time. LABS:  Reviewed any pertinent new labs that are available. PFTs 11/21/18  FVC  (64%) FEV1 1.88 (53%) FEV1/FVC ratio 0.62  TLC  (71%)  RV  (%)   DLCO (78%) Bronchodilator response: no    6MWT: 1000ft, 96%    IMAGING:  I personally reviewed and interpreted the following today in the office:   CXR:10/19/18   There is stable fine linear opacity noted in the right mid lung.  Linear   streak like opacities are in the left base.  Bronchovascular markings are   chronically prominent.  No consolidation has developed         ASSESSMENT:  · Moderately Severe COPD  · Morbid Obesity  · Severe ELPIDIO on BiPAP 19/14 cm H2O  · H/o Pulmonary Histoplasmosis dx by tyson in 2001     PLAN:   · Start Incruse  · Pulmonary rehab declined due to travel and hip pain.    · Continue PRN Ventolin  · Sleep clinic ascheduled; Bipap 19/14  · Flu vaccine UTD  · F/u in 6 months

## 2020-03-02 ENCOUNTER — TELEPHONE (OUTPATIENT)
Dept: PULMONOLOGY | Age: 65
End: 2020-03-02

## 2020-03-02 ENCOUNTER — OFFICE VISIT (OUTPATIENT)
Dept: PULMONOLOGY | Age: 65
End: 2020-03-02
Payer: MEDICAID

## 2020-03-02 VITALS
DIASTOLIC BLOOD PRESSURE: 80 MMHG | OXYGEN SATURATION: 98 % | RESPIRATION RATE: 18 BRPM | BODY MASS INDEX: 33.77 KG/M2 | HEIGHT: 69 IN | HEART RATE: 66 BPM | WEIGHT: 228 LBS | SYSTOLIC BLOOD PRESSURE: 130 MMHG

## 2020-03-02 PROCEDURE — G8417 CALC BMI ABV UP PARAM F/U: HCPCS | Performed by: NURSE PRACTITIONER

## 2020-03-02 PROCEDURE — 99213 OFFICE O/P EST LOW 20 MIN: CPT | Performed by: NURSE PRACTITIONER

## 2020-03-02 PROCEDURE — 1036F TOBACCO NON-USER: CPT | Performed by: NURSE PRACTITIONER

## 2020-03-02 PROCEDURE — G8482 FLU IMMUNIZE ORDER/ADMIN: HCPCS | Performed by: NURSE PRACTITIONER

## 2020-03-02 PROCEDURE — G8427 DOCREV CUR MEDS BY ELIG CLIN: HCPCS | Performed by: NURSE PRACTITIONER

## 2020-03-02 PROCEDURE — 3017F COLORECTAL CA SCREEN DOC REV: CPT | Performed by: NURSE PRACTITIONER

## 2020-03-02 ASSESSMENT — SLEEP AND FATIGUE QUESTIONNAIRES
HOW LIKELY ARE YOU TO NOD OFF OR FALL ASLEEP WHILE LYING DOWN TO REST IN THE AFTERNOON WHEN CIRCUMSTANCES PERMIT: 1
HOW LIKELY ARE YOU TO NOD OFF OR FALL ASLEEP WHILE SITTING AND TALKING TO SOMEONE: 1
HOW LIKELY ARE YOU TO NOD OFF OR FALL ASLEEP WHILE SITTING AND READING: 1
HOW LIKELY ARE YOU TO NOD OFF OR FALL ASLEEP WHILE SITTING INACTIVE IN A PUBLIC PLACE: 1
HOW LIKELY ARE YOU TO NOD OFF OR FALL ASLEEP IN A CAR, WHILE STOPPED FOR A FEW MINUTES IN TRAFFIC: 0
HOW LIKELY ARE YOU TO NOD OFF OR FALL ASLEEP WHEN YOU ARE A PASSENGER IN A CAR FOR AN HOUR WITHOUT A BREAK: 1
NECK CIRCUMFERENCE (INCHES): 16.5
HOW LIKELY ARE YOU TO NOD OFF OR FALL ASLEEP WHILE SITTING QUIETLY AFTER LUNCH WITHOUT ALCOHOL: 2
ESS TOTAL SCORE: 8
HOW LIKELY ARE YOU TO NOD OFF OR FALL ASLEEP WHILE WATCHING TV: 1

## 2020-03-02 NOTE — PROGRESS NOTES
fitting in office with RT  -Continue to increase BiPAP use. Recommend at least 7, preferably 8 hours of sleep with BiPAP nightly  - Advised to use BIPAP 6-8 hrs at night and during naps- need to increase use. - Replacement of mask, tubing, head straps every 3-6 months or sooner if damaged. - Patient instructed to contact [employer] company for any mask, tubing or machine trouble shooting if problems arise.  - Sleep hygiene  - Avoid sedatives, alcohol and caffeinated drinks at bed time. - Patient counseled to never drive or operate heavy machinery while fatigue, drowsy or sleepy-patient verbalized understanding and agrees. - Weight loss is recommended as a long-term intervention.    - Complications of ELPIDIO if not treated were discussed with patient patient, including: systemic hypertension, pulmonary hypertension, cardiovascular morbidities, car accidents and all cause mortality.  -Patient education handout provided regarding sleep tips and PAP cleaning recommendations     Follow up:  One year, sooner if needed

## 2020-04-20 NOTE — TELEPHONE ENCOUNTER
Houston Gonzalez with [employer] called stating that patient modem is not pinging even after they reset it. Houston Gonzalez will have Vicki Mooney respiratory therapist call patient and attempt to walk patient through manually resetting modem and sending compliance.

## 2020-07-17 ENCOUNTER — TELEPHONE (OUTPATIENT)
Dept: PULMONOLOGY | Age: 65
End: 2020-07-17

## 2020-07-21 ENCOUNTER — VIRTUAL VISIT (OUTPATIENT)
Dept: PULMONOLOGY | Age: 65
End: 2020-07-21
Payer: MEDICAID

## 2020-07-21 PROCEDURE — 99442 PR PHYS/QHP TELEPHONE EVALUATION 11-20 MIN: CPT | Performed by: INTERNAL MEDICINE

## 2020-07-21 RX ORDER — ALBUTEROL SULFATE 2.5 MG/3ML
2.5 SOLUTION RESPIRATORY (INHALATION) EVERY 6 HOURS PRN
Qty: 125 EACH | Refills: 5 | Status: SHIPPED | OUTPATIENT
Start: 2020-07-21 | End: 2021-07-12

## 2020-09-18 ENCOUNTER — OFFICE VISIT (OUTPATIENT)
Dept: PRIMARY CARE CLINIC | Age: 65
End: 2020-09-18
Payer: MEDICAID

## 2020-09-18 VITALS
WEIGHT: 221.6 LBS | HEIGHT: 69 IN | DIASTOLIC BLOOD PRESSURE: 90 MMHG | SYSTOLIC BLOOD PRESSURE: 145 MMHG | HEART RATE: 69 BPM | TEMPERATURE: 98 F | BODY MASS INDEX: 32.82 KG/M2 | OXYGEN SATURATION: 97 %

## 2020-09-18 PROBLEM — E66.9 OBESITY (BMI 30-39.9): Status: RESOLVED | Noted: 2019-03-04 | Resolved: 2020-09-18

## 2020-09-18 PROCEDURE — G8417 CALC BMI ABV UP PARAM F/U: HCPCS | Performed by: FAMILY MEDICINE

## 2020-09-18 PROCEDURE — 1036F TOBACCO NON-USER: CPT | Performed by: FAMILY MEDICINE

## 2020-09-18 PROCEDURE — 99214 OFFICE O/P EST MOD 30 MIN: CPT | Performed by: FAMILY MEDICINE

## 2020-09-18 PROCEDURE — 3023F SPIROM DOC REV: CPT | Performed by: FAMILY MEDICINE

## 2020-09-18 PROCEDURE — 3017F COLORECTAL CA SCREEN DOC REV: CPT | Performed by: FAMILY MEDICINE

## 2020-09-18 PROCEDURE — G8926 SPIRO NO PERF OR DOC: HCPCS | Performed by: FAMILY MEDICINE

## 2020-09-18 PROCEDURE — G8427 DOCREV CUR MEDS BY ELIG CLIN: HCPCS | Performed by: FAMILY MEDICINE

## 2020-09-18 RX ORDER — SILDENAFIL 50 MG/1
50 TABLET, FILM COATED ORAL PRN
Qty: 9 TABLET | Refills: 1 | Status: SHIPPED | OUTPATIENT
Start: 2020-09-18 | End: 2021-05-17

## 2020-09-18 ASSESSMENT — ENCOUNTER SYMPTOMS
EYE DISCHARGE: 0
CHOKING: 0
COLOR CHANGE: 0
EYES NEGATIVE: 1
VOMITING: 0
NAUSEA: 0
ABDOMINAL PAIN: 0
COUGH: 0
CHEST TIGHTNESS: 0
TROUBLE SWALLOWING: 0
EYE REDNESS: 0
PHOTOPHOBIA: 0
SINUS PRESSURE: 0
SHORTNESS OF BREATH: 0
BLOOD IN STOOL: 0
EYE ITCHING: 0
EYE PAIN: 0
VOICE CHANGE: 0
CONSTIPATION: 0
ABDOMINAL DISTENTION: 0
APNEA: 0
BACK PAIN: 0
RHINORRHEA: 1
SINUS PAIN: 0
DIARRHEA: 0
SORE THROAT: 0

## 2020-09-18 ASSESSMENT — PATIENT HEALTH QUESTIONNAIRE - PHQ9
SUM OF ALL RESPONSES TO PHQ QUESTIONS 1-9: 0
SUM OF ALL RESPONSES TO PHQ9 QUESTIONS 1 & 2: 0
SUM OF ALL RESPONSES TO PHQ QUESTIONS 1-9: 0
2. FEELING DOWN, DEPRESSED OR HOPELESS: 0
1. LITTLE INTEREST OR PLEASURE IN DOING THINGS: 0
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
## 2020-10-09 ENCOUNTER — HOSPITAL ENCOUNTER (OUTPATIENT)
Age: 65
Discharge: HOME | End: 2020-10-09
Payer: MEDICAID

## 2020-10-09 LAB
A/G RATIO: 1.3 (ref 1.1–2.2)
ALBUMIN SERPL-MCNC: 4 G/DL (ref 3.4–5)
ALP BLD-CCNC: 61 U/L (ref 40–129)
ALT SERPL-CCNC: 19 U/L (ref 10–40)
ANION GAP SERPL CALCULATED.3IONS-SCNC: 9 MMOL/L (ref 3–16)
AST SERPL-CCNC: 22 U/L (ref 15–37)
BASOPHILS ABSOLUTE: 0 K/UL (ref 0–0.2)
BASOPHILS RELATIVE PERCENT: 0.8 %
BILIRUB SERPL-MCNC: 0.3 MG/DL (ref 0–1)
BUN BLDV-MCNC: 14 MG/DL (ref 7–20)
CALCIUM SERPL-MCNC: 9.4 MG/DL (ref 8.3–10.6)
CHLORIDE BLD-SCNC: 102 MMOL/L (ref 99–110)
CHOLESTEROL, TOTAL: 204 MG/DL (ref 0–199)
CO2: 26 MMOL/L (ref 21–32)
CREAT SERPL-MCNC: 0.8 MG/DL (ref 0.8–1.3)
EOSINOPHILS ABSOLUTE: 0.2 K/UL (ref 0–0.6)
EOSINOPHILS RELATIVE PERCENT: 3.5 %
GFR AFRICAN AMERICAN: >60
GFR NON-AFRICAN AMERICAN: >60
GLOBULIN: 3.1 G/DL
GLUCOSE BLD-MCNC: 92 MG/DL (ref 70–99)
HCT VFR BLD CALC: 43.8 % (ref 40.5–52.5)
HDLC SERPL-MCNC: 54 MG/DL (ref 40–60)
HEMOGLOBIN: 14.8 G/DL (ref 13.5–17.5)
HEPATITIS C ANTIBODY INTERPRETATION: NORMAL
LDL CHOLESTEROL CALCULATED: 134 MG/DL
LYMPHOCYTES ABSOLUTE: 0.9 K/UL (ref 1–5.1)
LYMPHOCYTES RELATIVE PERCENT: 19.8 %
MCH RBC QN AUTO: 30 PG (ref 26–34)
MCHC RBC AUTO-ENTMCNC: 33.8 G/DL (ref 31–36)
MCV RBC AUTO: 88.7 FL (ref 80–100)
MONOCYTES ABSOLUTE: 0.3 K/UL (ref 0–1.3)
MONOCYTES RELATIVE PERCENT: 6.4 %
NEUTROPHILS ABSOLUTE: 3.2 K/UL (ref 1.7–7.7)
NEUTROPHILS RELATIVE PERCENT: 69.5 %
PDW BLD-RTO: 14.2 % (ref 12.4–15.4)
PLATELET # BLD: 141 K/UL (ref 135–450)
PMV BLD AUTO: 8.1 FL (ref 5–10.5)
POTASSIUM SERPL-SCNC: 4.5 MMOL/L (ref 3.5–5.1)
PROSTATE SPECIFIC ANTIGEN: 0.73 NG/ML (ref 0–4)
RBC # BLD: 4.94 M/UL (ref 4.2–5.9)
SODIUM BLD-SCNC: 137 MMOL/L (ref 136–145)
TOTAL PROTEIN: 7.1 G/DL (ref 6.4–8.2)
TRIGL SERPL-MCNC: 82 MG/DL (ref 0–150)
TSH SERPL DL<=0.05 MIU/L-ACNC: 1.59 UIU/ML (ref 0.27–4.2)
VITAMIN D 25-HYDROXY: 16.7 NG/ML
VLDLC SERPL CALC-MCNC: 16 MG/DL
WBC # BLD: 4.5 K/UL (ref 4–11)

## 2020-10-09 PROCEDURE — 86803 HEPATITIS C AB TEST: CPT

## 2020-10-09 PROCEDURE — 82306 VITAMIN D 25 HYDROXY: CPT

## 2020-10-09 PROCEDURE — 85025 COMPLETE CBC W/AUTO DIFF WBC: CPT

## 2020-10-09 PROCEDURE — 80053 COMPREHEN METABOLIC PANEL: CPT

## 2020-10-09 PROCEDURE — 83036 HEMOGLOBIN GLYCOSYLATED A1C: CPT

## 2020-10-09 PROCEDURE — 84443 ASSAY THYROID STIM HORMONE: CPT

## 2020-10-09 PROCEDURE — 80061 LIPID PANEL: CPT

## 2020-10-09 PROCEDURE — 36415 COLL VENOUS BLD VENIPUNCTURE: CPT

## 2020-10-09 PROCEDURE — 84153 ASSAY OF PSA TOTAL: CPT

## 2020-10-10 LAB
ESTIMATED AVERAGE GLUCOSE: 93.9 MG/DL
HBA1C MFR BLD: 4.9 %

## 2020-10-12 RX ORDER — ERGOCALCIFEROL 1.25 MG/1
50000 CAPSULE ORAL WEEKLY
Qty: 12 CAPSULE | Refills: 1 | Status: SHIPPED | OUTPATIENT
Start: 2020-10-12 | End: 2021-05-17

## 2020-10-21 ENCOUNTER — OFFICE VISIT (OUTPATIENT)
Dept: ORTHOPEDIC SURGERY | Age: 65
End: 2020-10-21
Payer: MEDICAID

## 2020-10-21 VITALS — BODY MASS INDEX: 32.73 KG/M2 | WEIGHT: 221 LBS | HEIGHT: 69 IN

## 2020-10-21 PROCEDURE — G8484 FLU IMMUNIZE NO ADMIN: HCPCS | Performed by: PHYSICAL MEDICINE & REHABILITATION

## 2020-10-21 PROCEDURE — G8417 CALC BMI ABV UP PARAM F/U: HCPCS | Performed by: PHYSICAL MEDICINE & REHABILITATION

## 2020-10-21 PROCEDURE — 1036F TOBACCO NON-USER: CPT | Performed by: PHYSICAL MEDICINE & REHABILITATION

## 2020-10-21 PROCEDURE — 99214 OFFICE O/P EST MOD 30 MIN: CPT | Performed by: PHYSICAL MEDICINE & REHABILITATION

## 2020-10-21 PROCEDURE — G8427 DOCREV CUR MEDS BY ELIG CLIN: HCPCS | Performed by: PHYSICAL MEDICINE & REHABILITATION

## 2020-10-21 PROCEDURE — 3017F COLORECTAL CA SCREEN DOC REV: CPT | Performed by: PHYSICAL MEDICINE & REHABILITATION
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
## 2020-10-22 RX ORDER — METHYLPREDNISOLONE 4 MG/1
TABLET ORAL
Qty: 1 KIT | Refills: 0 | Status: SHIPPED | OUTPATIENT
Start: 2020-10-22 | End: 2022-03-31

## 2020-11-02 ENCOUNTER — HOSPITAL ENCOUNTER (OUTPATIENT)
Dept: ULTRASOUND IMAGING | Age: 65
Discharge: HOME | End: 2020-11-02
Payer: MEDICAID

## 2020-11-02 PROCEDURE — 76536 US EXAM OF HEAD AND NECK: CPT

## 2020-11-10 ENCOUNTER — OFFICE VISIT (OUTPATIENT)
Dept: ORTHOPEDIC SURGERY | Age: 65
End: 2020-11-10
Payer: MEDICAID

## 2020-11-10 PROCEDURE — 95886 MUSC TEST DONE W/N TEST COMP: CPT | Performed by: PHYSICAL MEDICINE & REHABILITATION

## 2020-11-10 PROCEDURE — 95911 NRV CNDJ TEST 9-10 STUDIES: CPT | Performed by: PHYSICAL MEDICINE & REHABILITATION

## 2020-11-10 NOTE — PROGRESS NOTES
[facility], [id number]   Ph: [phone]  NCS & Electromyography Report        Full Name: Tania Stark Gender: Male  Patient ID: [id number] YOB: 1955      Visit Date: 11/10/2020 07:53  Age: 60 Years 2 Months Old  Examining Physician: Dr Vu Allan  Referring Physician: Dr Vu Allan  Patient History: char hand numbness      Sensory NCS      Nerve / Sites Rec. Site Onset Lat Peak Lat PP Amp Segments Distance Peak Diff Velocity     ms ms µV  cm ms m/s   R Median - D2 Ulnar D5      Median Dig II 2.86 3.54 24.7 Median - Dig II 14  49      Ref. ?3.70 ? 20.0 Ref. ?53      Ulnar Dig V 2.66 3.23 10.5 Ulnar - Median 14 -0.31 672      Ref. ?3.50 ? 10.0 Ref. ?53   L Median - D2 Ulnar D5      Median Dig II 2.97 3.75 18.0 Median - Dig II 14  47      Ref. ?3.70 ? 20.0 Ref. ?53      Ulnar Dig V 3.02 3.49 24.0 Ulnar - Median 14 -0.26 2688      Ref. ?3.50 ? 10.0 Ref. ?53   L Median, Radial - Radial Thumb      Radial Wrist Thumb 1.67 2.14 22.7 Radial Wrist - Thumb 10  60        Radial Thumb - Wrist           Ref. ?0.50    R Radial - Anatomical snuff box (Forearm)      Forearm Wrist 1.82 2.40 21.5 Forearm - Wrist 10  55      Ref. ?2.70 ? 20.0 Ref. ?48       Motor NCS      Nerve / Sites Muscle Latency Amplitude Amp % Duration Segments Distance Lat Diff Velocity     ms mV % ms  cm ms m/s   R Median - APB      Wrist APB 3.49 5.4 100 7.92 Wrist - APB 8        Ref. ?4.40 ?4.0   Ref. Elbow APB 7.76 3.9 71.8 8.39 Elbow - Wrist 25 4.27 59      Ref. Ref.   ?49   L Median - APB      Wrist APB 3.70 8.6 100 6.82 Wrist - APB 8        Ref. ?4.40 ?4.0   Ref. Elbow APB 8.28 8.3 95.7 7.03 Elbow - Wrist 27 4.58 59      Ref. Ref.   ?49   R Ulnar - ADM      Wrist ADM 2.97 8.2 100 7.92 Wrist - ADM 8        Ref. ?3.60 ?5.0   Ref. B. Elbow ADM 6.77 8.2 99.2 8.65 B. Elbow - Wrist 23 3.80 60      Ref. Ref.   ?49      A. Elbow ADM 9.27 8.0 97.2 8.96 A. Elbow - B. Elbow 10

## 2020-11-12 ENCOUNTER — OFFICE VISIT (OUTPATIENT)
Dept: ORTHOPEDIC SURGERY | Age: 65
End: 2020-11-12
Payer: MEDICAID

## 2020-11-12 VITALS — HEIGHT: 69 IN | WEIGHT: 221 LBS | BODY MASS INDEX: 32.73 KG/M2

## 2020-11-12 PROCEDURE — 1036F TOBACCO NON-USER: CPT | Performed by: PHYSICAL MEDICINE & REHABILITATION

## 2020-11-12 PROCEDURE — G8427 DOCREV CUR MEDS BY ELIG CLIN: HCPCS | Performed by: PHYSICAL MEDICINE & REHABILITATION

## 2020-11-12 PROCEDURE — G8484 FLU IMMUNIZE NO ADMIN: HCPCS | Performed by: PHYSICAL MEDICINE & REHABILITATION

## 2020-11-12 PROCEDURE — 3017F COLORECTAL CA SCREEN DOC REV: CPT | Performed by: PHYSICAL MEDICINE & REHABILITATION

## 2020-11-12 PROCEDURE — G8417 CALC BMI ABV UP PARAM F/U: HCPCS | Performed by: PHYSICAL MEDICINE & REHABILITATION

## 2020-11-12 PROCEDURE — 99214 OFFICE O/P EST MOD 30 MIN: CPT | Performed by: PHYSICAL MEDICINE & REHABILITATION

## 2020-11-12 RX ORDER — GABAPENTIN 300 MG/1
300 CAPSULE ORAL 3 TIMES DAILY
Qty: 90 CAPSULE | Refills: 0 | Status: SHIPPED | OUTPATIENT
Start: 2020-11-12 | End: 2020-12-10 | Stop reason: SDUPTHER

## 2020-11-12 NOTE — PROGRESS NOTES
(100.2 kg). GENERAL EXAM:  · General Apparence: Patient is adequately groomed with no evidence of malnutrition. · Orientation: The patient is oriented to time, place and person. · Mood & Affect:The patient's mood and affect are appropriate   · Vascular: Examination reveals no swelling tenderness in upper or lower extremities. · Lymphatic: The lymphatic examination bilaterally reveals all areas to be without enlargement or induration  · Sensation: Sensation is intact without deficit  · Coordination/Balance: Good coordination     CERVICAL EXAMINATION:  · Inspection: His head is listed forward  · Palpation: No evidence of tenderness at the midline, and trapezius. Paraspinal tenderness is present. There is no step-off or paraspinal spasm. · Range of Motion: 20% loss of flexion and extensionStrength: 5/5 bilateral upper extremities   · Special Tests:    ·   Spurling's, L'Hermitte's & Perez's negative bilaterally. ·   Valencia and Impingement tests are negative bilaterally. ·  Cubital and Carpal tunnel Tinel's negative bilaterally. · Skin:There are no rashes, ulcerations or lesions in right & left upper extremities. · Reflexes: Bilaterally triceps, biceps and brachioradialis are paced clonus absent bilaterally at the feet. · Gait & station: Normal gait  · Additional Examinations:         · RIGHT UPPER EXTREMITY:  Inspection/examination of the right upper extremity does not show any tenderness, deformity or injury. Range of motion is full. There is no gross instability. There are no rashes, ulcerations or lesions. Strength and tone are normal.  · LEFT UPPER EXTREMITY: Inspection/examination of the left upper extremity does not show any tenderness, deformity or injury. Range of motion is full. There is no gross instability. There are no rashes, ulcerations or lesions. Strength and tone are normal.    Diagnostic Testing:      This cervical MRI= report were independently reviewed showing multilevel DDD with multilevel foraminal narrowing signal changes around the left parotid gland and left thyroid     2020 hemoglobin A1c, CBC, CMP reviewed    EMG bilateral upper extremity shows only mild right ulnar neuropathy    Impression: Not improved  #1 chronic cervical strain-hand numbness  #2 cervical DDD, spondylosis  #3 parotid and thyroid cysts    Plan:    I assume his numbness in her hands are more likely atypical radiculitis symptoms with associated pain in his neck    Discussed cervical epidurals versus medications.     He will try gabapentin slowly increasing to 300 3 times daily    1 month follow-up      NORI Choudhury

## 2020-11-19 ENCOUNTER — TELEPHONE (OUTPATIENT)
Dept: ORTHOPEDIC SURGERY | Age: 65
End: 2020-11-19

## 2020-12-10 ENCOUNTER — OFFICE VISIT (OUTPATIENT)
Dept: ORTHOPEDIC SURGERY | Age: 65
End: 2020-12-10
Payer: MEDICAID

## 2020-12-10 VITALS — BODY MASS INDEX: 32.73 KG/M2 | WEIGHT: 221 LBS | HEIGHT: 69 IN

## 2020-12-10 PROCEDURE — 1036F TOBACCO NON-USER: CPT | Performed by: PHYSICAL MEDICINE & REHABILITATION

## 2020-12-10 PROCEDURE — G8484 FLU IMMUNIZE NO ADMIN: HCPCS | Performed by: PHYSICAL MEDICINE & REHABILITATION

## 2020-12-10 PROCEDURE — G8427 DOCREV CUR MEDS BY ELIG CLIN: HCPCS | Performed by: PHYSICAL MEDICINE & REHABILITATION

## 2020-12-10 PROCEDURE — G8417 CALC BMI ABV UP PARAM F/U: HCPCS | Performed by: PHYSICAL MEDICINE & REHABILITATION

## 2020-12-10 PROCEDURE — 3017F COLORECTAL CA SCREEN DOC REV: CPT | Performed by: PHYSICAL MEDICINE & REHABILITATION

## 2020-12-10 PROCEDURE — 99213 OFFICE O/P EST LOW 20 MIN: CPT | Performed by: PHYSICAL MEDICINE & REHABILITATION

## 2020-12-10 RX ORDER — GABAPENTIN 300 MG/1
300 CAPSULE ORAL 3 TIMES DAILY
Qty: 270 CAPSULE | Refills: 0 | Status: SHIPPED | OUTPATIENT
Start: 2020-12-10 | End: 2022-07-27
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
## 2021-02-18 ENCOUNTER — VIRTUAL VISIT (OUTPATIENT)
Dept: PULMONOLOGY | Age: 66
End: 2021-02-18
Payer: MEDICAID

## 2021-02-18 DIAGNOSIS — J44.9 MODERATE COPD (CHRONIC OBSTRUCTIVE PULMONARY DISEASE) (HCC): Primary | ICD-10-CM

## 2021-02-18 PROCEDURE — 99442 PR PHYS/QHP TELEPHONE EVALUATION 11-20 MIN: CPT | Performed by: INTERNAL MEDICINE

## 2021-02-18 RX ORDER — ALBUTEROL SULFATE 90 UG/1
2-4 AEROSOL, METERED RESPIRATORY (INHALATION) EVERY 4 HOURS PRN
Qty: 1 INHALER | Refills: 5 | Status: SHIPPED | OUTPATIENT
Start: 2021-02-18
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
## 2021-03-09 ENCOUNTER — TELEPHONE (OUTPATIENT)
Dept: PULMONOLOGY | Age: 66
End: 2021-03-09

## 2021-03-09 NOTE — TELEPHONE ENCOUNTER
Patient cancelled appointment on 3/8/21 with Randy Chapman for 1 yr sleep. Reason: pt cancelled via [employer]     Patient did reschedule appointment. Appointment rescheduled for 4/16/21. Last OV 3/2/20      Assessment:       · Severe ELPIDIO. BiPAP 19/14 cm H2O. Sub- Optimal compliance on review today over past 1-2 month patient states due to illness, was previously compliant. · Snoring- resolved on BIPAP   · Hypersomnia-much improved with BIPAP  · Obesity  · HTN     Plan:     - Changed in office to BiPAP 18/13 cm H2O due to weight loss and pressure feels too high  -Mask fitting in office with RT  -Continue to increase BiPAP use. Recommend at least 7, preferably 8 hours of sleep with BiPAP nightly  - Advised to use BIPAP 6-8 hrs at night and during naps- need to increase use. - Replacement of mask, tubing, head straps every 3-6 months or sooner if damaged. - Patient instructed to contact [employer] company for any mask, tubing or machine trouble shooting if problems arise.  - Sleep hygiene  - Avoid sedatives, alcohol and caffeinated drinks at bed time. - Patient counseled to never drive or operate heavy machinery while fatigue, drowsy or sleepy-patient verbalized understanding and agrees. - Weight loss is recommended as a long-term intervention.    - Complications of ELPIDIO if not treated were discussed with patient patient, including: systemic hypertension, pulmonary hypertension, cardiovascular morbidities, car accidents and all cause mortality.  -Patient education handout provided regarding sleep tips and PAP cleaning recommendations      Follow up:  One year, sooner if needed

## 2021-03-12 ENCOUNTER — IMMUNIZATION (OUTPATIENT)
Dept: PRIMARY CARE CLINIC | Age: 66
End: 2021-03-12
Payer: MEDICAID

## 2021-03-12 PROCEDURE — 91300 COVID-19, PFIZER VACCINE 30MCG/0.3ML DOSE: CPT | Performed by: FAMILY MEDICINE

## 2021-03-12 PROCEDURE — 0001A COVID-19, PFIZER VACCINE 30MCG/0.3ML DOSE: CPT | Performed by: FAMILY MEDICINE

## 2021-03-22 RX ORDER — TIOTROPIUM BROMIDE INHALATION SPRAY 3.12 UG/1
SPRAY, METERED RESPIRATORY (INHALATION)
Qty: 1 INHALER | Refills: 5 | Status: SHIPPED | OUTPATIENT
Start: 2021-03-22 | End: 2021-11-15 | Stop reason: CLARIF

## 2021-04-02 ENCOUNTER — IMMUNIZATION (OUTPATIENT)
Dept: PRIMARY CARE CLINIC | Age: 66
End: 2021-04-02
Payer: MEDICAID

## 2021-04-02 PROCEDURE — 0002A COVID-19, PFIZER VACCINE 30MCG/0.3ML DOSE: CPT | Performed by: FAMILY MEDICINE

## 2021-04-02 PROCEDURE — 91300 COVID-19, PFIZER VACCINE 30MCG/0.3ML DOSE: CPT | Performed by: FAMILY MEDICINE

## 2021-05-05 ENCOUNTER — TELEPHONE (OUTPATIENT)
Dept: PULMONOLOGY | Age: 66
End: 2021-05-05

## 2021-05-05 NOTE — TELEPHONE ENCOUNTER
[facility]  Patient Appointment Cancel Request Pool 1 hour ago (9:04 AM)        Appointment canceled for Justine Maldonado (<[id number]>)  Visit Type: OFFICE VISIT  Date        Time      Length    Provider                  Department  5/10/2021    8:40 AM  20 mins. Lia Trotter, APRN - CNP      [facility]     Reason for Cancellation: Patient preference     Patient Comments: Conflict of appointment with other doctor        This relocality message has not been read.

## 2021-05-13 NOTE — TELEPHONE ENCOUNTER
Patient called with message left for patient to call back to office.      I have called patient three times with no answer

## 2021-07-02 ENCOUNTER — TELEPHONE (OUTPATIENT)
Dept: PULMONOLOGY | Age: 66
End: 2021-07-02

## 2021-07-02 ENCOUNTER — VIRTUAL VISIT (OUTPATIENT)
Dept: PULMONOLOGY | Age: 66
End: 2021-07-02
Payer: MEDICAID

## 2021-07-02 DIAGNOSIS — R53.83 OTHER FATIGUE: ICD-10-CM

## 2021-07-02 DIAGNOSIS — G47.33 SEVERE OBSTRUCTIVE SLEEP APNEA: Primary | ICD-10-CM

## 2021-07-02 DIAGNOSIS — Z71.89 ENCOUNTER FOR BIPAP USE COUNSELING: ICD-10-CM

## 2021-07-02 DIAGNOSIS — E66.9 OBESITY (BMI 30.0-34.9): ICD-10-CM

## 2021-07-02 DIAGNOSIS — Z78.9 DIFFICULTY WITH BIPAP USE: ICD-10-CM

## 2021-07-02 PROBLEM — E66.811 OBESITY (BMI 30.0-34.9): Status: ACTIVE | Noted: 2019-03-04

## 2021-07-02 PROCEDURE — 99442 PR PHYS/QHP TELEPHONE EVALUATION 11-20 MIN: CPT | Performed by: NURSE PRACTITIONER

## 2021-07-02 ASSESSMENT — SLEEP AND FATIGUE QUESTIONNAIRES
HOW LIKELY ARE YOU TO NOD OFF OR FALL ASLEEP WHILE SITTING INACTIVE IN A PUBLIC PLACE: 0
HOW LIKELY ARE YOU TO NOD OFF OR FALL ASLEEP WHILE LYING DOWN TO REST IN THE AFTERNOON WHEN CIRCUMSTANCES PERMIT: 2
HOW LIKELY ARE YOU TO NOD OFF OR FALL ASLEEP IN A CAR, WHILE STOPPED FOR A FEW MINUTES IN TRAFFIC: 0
HOW LIKELY ARE YOU TO NOD OFF OR FALL ASLEEP WHILE SITTING QUIETLY AFTER LUNCH WITHOUT ALCOHOL: 1
HOW LIKELY ARE YOU TO NOD OFF OR FALL ASLEEP WHILE SITTING AND READING: 2
HOW LIKELY ARE YOU TO NOD OFF OR FALL ASLEEP WHILE WATCHING TV: 2
ESS TOTAL SCORE: 8
HOW LIKELY ARE YOU TO NOD OFF OR FALL ASLEEP WHILE SITTING AND TALKING TO SOMEONE: 0
HOW LIKELY ARE YOU TO NOD OFF OR FALL ASLEEP WHEN YOU ARE A PASSENGER IN A CAR FOR AN HOUR WITHOUT A BREAK: 1

## 2021-07-02 NOTE — TELEPHONE ENCOUNTER
Within this Telehealth Consent, the terms you and yours refer to the person using the Telehealth Service (Service), or in the case of a use of the Service by or on behalf of a minor, you and yours refer to and include (i) the parent or legal guardian who provides consent to the use of the Service by such minor or uses the Service on behalf of such minor, and (ii) the minor for whom consent is being provided or on whose behalf the Service is being utilized. When using Service, you will be consulting with your health care providers via the use of Telehealth.   Telehealth involves the delivery of healthcare services using electronic communications, information technology or other means between a healthcare provider and a patient who are not in the same physical location. Telehealth may be used for diagnosis, treatment, follow-up and/or patient education, and may include, but is not limited to, one or more of the following:    Electronic transmission of medical records, photo images, personal health information or other data between a patient and a healthcare provider    Interactions between a patient and healthcare provider via audio, video and/or data communications    Use of output data from medical devices, sound and video files    Anticipated Benefits   The use of Telehealth by your Provider(s) through the Service may have the following possible benefits:    Making it easier and more efficient for you to access medical care and treatment for the conditions treated by such Provider(s) utilizing the Service    Allowing you to obtain medical care and treatment by Provider(s) at times that are convenient for you    Enabling you to interact with Provider(s) without the necessity of an in-office appointment     Possible Risks   While the use of Telehealth can provide potential benefits for you, there are also potential risks associated with the use of Telehealth.  These risks include, but may not be limited to the following:    Your Provider(s) may not able to provide medical treatment for your particular condition and you may be required to seek alternative healthcare or emergency care services.  The electronic systems or other security protocols or safeguards used in the Service could fail, causing a breach of privacy of your medical or other information.  Given regulatory requirements in certain jurisdictions, your Provider(s) diagnosis and/or treatment options, especially pertaining to certain prescriptions, may be limited. Acceptance   1. You understand that Services will be provided via Telehealth. This process involves the use of HIPAA compliant and secure, real-time audio-visual interfacing with a qualified and appropriately trained provider located at [facility]. 2. You understand that, under no circumstances, will this session be recorded. 3. You understand that the Provider(s) at [facility] and other clinical participants will be party to the information obtained during the Telehealth session in accordance with best medical practices. 4. You understand that the information obtained during the Telehealth session will be used to help determine the most appropriate treatment options. 5. You understand that You have the right to revoke this consent at any point in time. 6. You understand that Telehealth is voluntary, and that continued treatment is not dependent upon consent. 7. You understand that, in the event of non-consent to Telehealth services and/or technical difficulties, you will obtain services as typically provided in the absence of Telehealth technology. 8. You understand that this consent will be kept in Your medical record. 9. No potential benefits from the use of Telehealth or specific results can be guaranteed. Your condition may not be cured or improved and, in some cases, may get worse.    10. There are limitations in the provision of medical care and treatment via Telehealth and the Service and you may not be able to receive diagnosis and/or treatment through the Service for every condition for which you seek diagnosis and/or treatment. 11. There are potential risks to the use of Telehealth, including but not limited to the risks described in this Telehealth Consent. 12. Your Provider(s) have discussed the use of Telehealth and the Service with you, including the benefits and risks of such and you have provided oral consent to your Provider(s) for the use of Telehealth and the Service. 15. You understand that it is your duty to provide your Provider(s) truthful, accurate and complete information, including all relevant information regarding care that you may have received or may be receiving from other healthcare providers outside of the Service. 14. You understand that each of your Provider(s) may determine in his or sole discretion that your condition is not suitable for diagnosis and/or treatment using the Service, and that you may need to seek medical care and treatment a specialist or other healthcare provider, outside of the Service. 15. You understand that you are fully responsible for payment for all services provided by Provider(s) or through use of the Service and that you may not be able to use third-party insurance. 16. You represent that (a) you have read this Telehealth Consent carefully, (b) you understand the risks and benefits of the Service and the use of Telehealth in the medical care and treatment provided to you by Provider(s) using the Service, and (c) you have the legal capacity and authority to provide this consent for yourself and/or the minor for which you are consenting under applicable federal and state laws, including laws relating to the age of [de-identified] and/or parental/guardian consent.    17. You give your informed consent to the use of Telehealth by Provider(s) using the Service under the terms described in the Terms of Service and this Telehealth Consent. The patient was read the following statement and has consented to the visit as of 7/2/21.      The patient has been scheduled for their first telehealth visit on 07/02/2021 with Don Fitzgerald NP.
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
## 2021-07-02 NOTE — PATIENT INSTRUCTIONS
Sleep Hygiene. .. Tips for better sleep. .. Avoid naps. This will ensure you are sleepy at bedtime. If you have to take a nap, sleep less than 1 hour, before 3 pm.  Sleep only when sleepy; this reduces the time you are awake in bed. Regular exercise is recommended to help you deepen your sleep, but not within 4-6 hours of your bedtime. Timing of exercise is important, aim to exercise early in the morning or early afternoon. A light snack may help you fall asleep. Warm milk and foods high in the amino acid tryptophan, such as bananas, may help you to sleep  Be sure to avoid heavy, spicy or sugary foods 4-6 hours before bedtime and avoid at snack time. Stay away from stimulants such as caffeine and nicotine for at least 4-6 hours before bed. Stimulants can interfere with your ability to fall asleep. Caffeine is found in tea, cola, coffee, cocoa and chocolate and is best avoided at bedtime. Nicotine is found in tobacco products. Avoid alcohol 4-6 hours before bedtime. Alcohol has an immediate sleep-inducing effect, after a few hours when alcohol levels fall there is a stimulant or wake-up effect and will cause fragmented sleep. Sleep rituals are important. Give your body clues it is time to slow down and sleep. Examples include; yoga, deep breathing, listen to relaxing music, a hot bath or a few minutes of reading. Have a fixed bedtime and awakening time, Even on weekends! You will feel better keeping a regular sleep cycle, even if you are retired or not working. Get into your favorite sleep position. If not asleep in 30 minutes, get up and do something boring until you feel sleepy. Remember not to expose yourself to bright lights such as TV, phone or tablet screens. Only use your bed for sleeping. Do not use your bed as an office, workroom or recreation room. Use comfortable bedding. Uncomfortable bedding can prevent good sleep. Ensure your bedroom is quiet and comfortable.  A cooler room along with recommended cleaning schedule. - Do change your disposable filter frequently. Adapted From: [employer]/cleaning. shtm.   These are general suggestions for all models please follow specific s recommendations and specific instructions

## 2021-07-09 DIAGNOSIS — J44.9 MODERATE COPD (CHRONIC OBSTRUCTIVE PULMONARY DISEASE) (HCC): ICD-10-CM

## 2021-07-12 RX ORDER — ALBUTEROL SULFATE 2.5 MG/3ML
SOLUTION RESPIRATORY (INHALATION)
Qty: 375 ML | Refills: 5 | Status: SHIPPED | OUTPATIENT
Start: 2021-07-12 | End: 2022-09-21 | Stop reason: SDUPTHER

## 2021-08-06 ENCOUNTER — TELEPHONE (OUTPATIENT)
Dept: PULMONOLOGY | Age: 66
End: 2021-08-06

## 2021-08-11 ENCOUNTER — TELEPHONE (OUTPATIENT)
Dept: PULMONOLOGY | Age: 66
End: 2021-08-11

## 2021-08-11 NOTE — TELEPHONE ENCOUNTER
Appointment canceled for Dave Moura (<[id number]>)   Visit Type: OFFICE VISIT   Date        Time      Length    Provider                  Department   8/31/2021    8:30 AM  15 mins. Justina Porter MD             [facility]      Reason for Cancellation: Financial            ASSESSMENT:2/18/21  · Moderately Severe COPD  · Morbid Obesity  · Severe ELPIDIO on BiPAP 19/14 cm H2O  · H/o Pulmonary Histoplasmosis dx by [facility] in 2001     PLAN:   · Continue Spiriva  · PRN albuterol nebs/MDI  · Pulmonary rehab declined   · Continue PRN Ventolin  · Continue Bipap with good compliance  · F/u in 6 months

## 2021-09-28 ENCOUNTER — TELEPHONE (OUTPATIENT)
Dept: PULMONOLOGY | Age: 66
End: 2021-09-28

## 2021-11-02 ENCOUNTER — TELEPHONE (OUTPATIENT)
Dept: ORTHOPEDIC SURGERY | Age: 66
End: 2021-11-02

## 2021-11-10 ENCOUNTER — TELEPHONE (OUTPATIENT)
Dept: PULMONOLOGY | Age: 66
End: 2021-11-10

## 2021-11-15 RX ORDER — UMECLIDINIUM 62.5 UG/1
AEROSOL, POWDER ORAL
Qty: 1 EACH | Refills: 5 | Status: SHIPPED
Start: 2021-11-15 | End: 2022-10-21 | Stop reason: CLARIF

## 2021-12-14 ENCOUNTER — TELEPHONE (OUTPATIENT)
Dept: PRIMARY CARE CLINIC | Age: 66
End: 2021-12-14

## 2021-12-14 NOTE — TELEPHONE ENCOUNTER
Pt wife voices patient having side effect of the covid booster vaccine   Symptoms is currently a headache     Please review

## 2021-12-14 NOTE — TELEPHONE ENCOUNTER
[phone] (home)   Spoke with patient, informed headache is common side effect. Pt rested and headache had gradually gone away.

## 2022-03-31 ENCOUNTER — TELEPHONE (OUTPATIENT)
Dept: PRIMARY CARE CLINIC | Age: 67
End: 2022-03-31

## 2022-03-31 ENCOUNTER — TELEMEDICINE (OUTPATIENT)
Dept: PRIMARY CARE CLINIC | Age: 67
End: 2022-03-31
Payer: MEDICARE

## 2022-03-31 DIAGNOSIS — K22.2 GERD WITH STRICTURE: ICD-10-CM

## 2022-03-31 DIAGNOSIS — K21.9 GERD WITH STRICTURE: ICD-10-CM

## 2022-03-31 DIAGNOSIS — J44.1 COPD EXACERBATION (HCC): Primary | ICD-10-CM

## 2022-03-31 DIAGNOSIS — J06.9 UPPER RESPIRATORY TRACT INFECTION, UNSPECIFIED TYPE: ICD-10-CM

## 2022-03-31 PROCEDURE — 99213 OFFICE O/P EST LOW 20 MIN: CPT | Performed by: STUDENT IN AN ORGANIZED HEALTH CARE EDUCATION/TRAINING PROGRAM

## 2022-03-31 RX ORDER — PREDNISONE 20 MG/1
20 TABLET ORAL 2 TIMES DAILY
Qty: 10 TABLET | Refills: 0 | Status: SHIPPED | OUTPATIENT
Start: 2022-03-31 | End: 2022-04-05

## 2022-03-31 RX ORDER — FLUTICASONE PROPIONATE 50 MCG
1 SPRAY, SUSPENSION (ML) NASAL DAILY
Qty: 16 G | Refills: 0 | Status: SHIPPED | OUTPATIENT
Start: 2022-03-31 | End: 2022-07-27

## 2022-03-31 RX ORDER — DOXYCYCLINE HYCLATE 100 MG
100 TABLET ORAL 2 TIMES DAILY
Qty: 14 TABLET | Refills: 0 | Status: SHIPPED | OUTPATIENT
Start: 2022-03-31 | End: 2022-04-07

## 2022-03-31 RX ORDER — OMEPRAZOLE 20 MG/1
CAPSULE, DELAYED RELEASE ORAL
Qty: 30 CAPSULE | Refills: 0 | Status: SHIPPED | OUTPATIENT
Start: 2022-03-31 | End: 2022-07-27 | Stop reason: SDUPTHER

## 2022-03-31 RX ORDER — GUAIFENESIN AND DEXTROMETHORPHAN HYDROBROMIDE 1200; 60 MG/1; MG/1
1 TABLET, EXTENDED RELEASE ORAL 2 TIMES DAILY PRN
Qty: 28 TABLET | Refills: 0 | Status: SHIPPED | OUTPATIENT
Start: 2022-03-31 | End: 2022-07-27

## 2022-03-31 SDOH — ECONOMIC STABILITY: FOOD INSECURITY: WITHIN THE PAST 12 MONTHS, YOU WORRIED THAT YOUR FOOD WOULD RUN OUT BEFORE YOU GOT MONEY TO BUY MORE.: NEVER TRUE

## 2022-03-31 SDOH — ECONOMIC STABILITY: FOOD INSECURITY: WITHIN THE PAST 12 MONTHS, THE FOOD YOU BOUGHT JUST DIDN'T LAST AND YOU DIDN'T HAVE MONEY TO GET MORE.: NEVER TRUE

## 2022-03-31 ASSESSMENT — PATIENT HEALTH QUESTIONNAIRE - PHQ9
SUM OF ALL RESPONSES TO PHQ QUESTIONS 1-9: 0
SUM OF ALL RESPONSES TO PHQ QUESTIONS 1-9: 0
1. LITTLE INTEREST OR PLEASURE IN DOING THINGS: 0
2. FEELING DOWN, DEPRESSED OR HOPELESS: 0
SUM OF ALL RESPONSES TO PHQ QUESTIONS 1-9: 0
SUM OF ALL RESPONSES TO PHQ QUESTIONS 1-9: 0
SUM OF ALL RESPONSES TO PHQ9 QUESTIONS 1 & 2: 0

## 2022-03-31 ASSESSMENT — SOCIAL DETERMINANTS OF HEALTH (SDOH): HOW HARD IS IT FOR YOU TO PAY FOR THE VERY BASICS LIKE FOOD, HOUSING, MEDICAL CARE, AND HEATING?: NOT HARD AT ALL

## 2022-03-31 NOTE — PROGRESS NOTES
Bri Clark (:  1955) is a Established patient, here for evaluation of the following:    Assessment & Plan   Below is the assessment and plan developed based on review of pertinent history, physical exam, labs, studies, and medications. 1. COPD exacerbation (HCC)  -     doxycycline hyclate (VIBRA-TABS) 100 MG tablet; Take 1 tablet by mouth 2 times daily for 7 days, Disp-14 tablet, R-0Normal  -     predniSONE (DELTASONE) 20 MG tablet; Take 1 tablet by mouth 2 times daily for 5 days, Disp-10 tablet, R-0Normal  -     Dextromethorphan-guaiFENesin (MUCINEX DM MAXIMUM STRENGTH)  MG TB12; Take 1 tablet by mouth 2 times daily as needed (cough and congestion), Disp-28 tablet, R-0Normal  -     omeprazole (PRILOSEC) 20 MG delayed release capsule; Take 1 pill in the morning 30 minutes before you take other medications or food to help with acid reflux, Disp-30 capsule, R-0Normal  -     fluticasone (FLONASE) 50 MCG/ACT nasal spray; 1 spray by Each Nostril route daily, Disp-16 g, R-0Normal  2. Upper respiratory tract infection, unspecified type  -     doxycycline hyclate (VIBRA-TABS) 100 MG tablet; Take 1 tablet by mouth 2 times daily for 7 days, Disp-14 tablet, R-0Normal  -     predniSONE (DELTASONE) 20 MG tablet; Take 1 tablet by mouth 2 times daily for 5 days, Disp-10 tablet, R-0Normal  -     Dextromethorphan-guaiFENesin (MUCINEX DM MAXIMUM STRENGTH)  MG TB12; Take 1 tablet by mouth 2 times daily as needed (cough and congestion), Disp-28 tablet, R-0Normal  -     omeprazole (PRILOSEC) 20 MG delayed release capsule; Take 1 pill in the morning 30 minutes before you take other medications or food to help with acid reflux, Disp-30 capsule, R-0Normal  -     fluticasone (FLONASE) 50 MCG/ACT nasal spray; 1 spray by Each Nostril route daily, Disp-16 g, R-0Normal  3.  GERD with stricture  Assessment & Plan:   Chronic  Would recommend starting Prilosec, follow-up with GI doctor  Orders:  -     omeprazole (PRILOSEC) 20 MG delayed release capsule; Take 1 pill in the morning 30 minutes before you take other medications or food to help with acid reflux, Disp-30 capsule, R-0Normal    Return if symptoms worsen or fail to improve. Subjective   HPI     Cough, phlegm, has small throat, vomited, and now with cough, has narrow esophagus. States that he choked on some food, and felt like he may have aspirated some. Hasnt tried anything, productive. Feels SHORTNESS OF BREATH, was at 94% O2  Last COPD exacerbation was a while ago. Review of Systems   All other systems reviewed and are negative. Objective   Patient-Reported Vitals  Patient-Reported Temperature: 98.9  Patient-Reported Weight: 230lb  Patient-Reported Height: 5'8.5\"  Patient-Reported Pulse Oximetry: 94       Physical Exam  Vitals reviewed. Constitutional:       General: He is not in acute distress. Appearance: Normal appearance. He is not ill-appearing, toxic-appearing or diaphoretic. HENT:      Head: Normocephalic and atraumatic. Right Ear: External ear normal.      Left Ear: External ear normal.      Nose: Nose normal.      Mouth/Throat:      Mouth: Mucous membranes are moist.   Eyes:      Extraocular Movements: Extraocular movements intact. Cardiovascular:      Rate and Rhythm: Normal rate and regular rhythm. Heart sounds: Normal heart sounds. No murmur heard. No friction rub. No gallop. Pulmonary:      Effort: Pulmonary effort is normal.      Breath sounds: Normal breath sounds. No wheezing, rhonchi or rales. Musculoskeletal:      Cervical back: Normal range of motion. Skin:     General: Skin is warm. Neurological:      General: No focal deficit present. Mental Status: He is alert. Psychiatric:         Mood and Affect: Mood normal.                  Rex Zavala, was evaluated through a synchronous (real-time) audio-video encounter.  The patient (or guardian if applicable) is aware that this is a billable service, which includes applicable co-pays. This Virtual Visit was conducted with patient's (and/or legal guardian's) consent. The visit was conducted pursuant to the emergency declaration under the [address] [place] [address], [address] [place] [address] authority and the [employer] and [employer] General Act. Patient identification was verified, and a caregiver was present when appropriate. The patient was located at home in a state where the provider was licensed to provide care.        --Beryle Moh, MD

## 2022-03-31 NOTE — TELEPHONE ENCOUNTER
Within this Telehealth Consent, the terms you and yours refer to the person using the Telehealth Service (Service), or in the case of a use of the Service by or on behalf of a minor, you and yours refer to and include (i) the parent or legal guardian who provides consent to the use of the Service by such minor or uses the Service on behalf of such minor, and (ii) the minor for whom consent is being provided or on whose behalf the Service is being utilized. When using Service, you will be consulting with your health care providers via the use of Telehealth.   Telehealth involves the delivery of healthcare services using electronic communications, information technology or other means between a healthcare provider and a patient who are not in the same physical location. Telehealth may be used for diagnosis, treatment, follow-up and/or patient education, and may include, but is not limited to, one or more of the following:    Electronic transmission of medical records, photo images, personal health information or other data between a patient and a healthcare provider    Interactions between a patient and healthcare provider via audio, video and/or data communications    Use of output data from medical devices, sound and video files    Anticipated Benefits   The use of Telehealth by your Provider(s) through the Service may have the following possible benefits:    Making it easier and more efficient for you to access medical care and treatment for the conditions treated by such Provider(s) utilizing the Service    Allowing you to obtain medical care and treatment by Provider(s) at times that are convenient for you    Enabling you to interact with Provider(s) without the necessity of an in-office appointment     Possible Risks   While the use of Telehealth can provide potential benefits for you, there are also potential risks associated with the use of Telehealth.  These risks include, but may not be limited to the following:    Your Provider(s) may not able to provide medical treatment for your particular condition and you may be required to seek alternative healthcare or emergency care services.  The electronic systems or other security protocols or safeguards used in the Service could fail, causing a breach of privacy of your medical or other information.  Given regulatory requirements in certain jurisdictions, your Provider(s) diagnosis and/or treatment options, especially pertaining to certain prescriptions, may be limited. Acceptance   1. You understand that Services will be provided via Telehealth. This process involves the use of HIPAA compliant and secure, real-time audio-visual interfacing with a qualified and appropriately trained provider located at [facility]. 2. You understand that, under no circumstances, will this session be recorded. 3. You understand that the Provider(s) at [facility] and other clinical participants will be party to the information obtained during the Telehealth session in accordance with best medical practices. 4. You understand that the information obtained during the Telehealth session will be used to help determine the most appropriate treatment options. 5. You understand that You have the right to revoke this consent at any point in time. 6. You understand that Telehealth is voluntary, and that continued treatment is not dependent upon consent. 7. You understand that, in the event of non-consent to Telehealth services and/or technical difficulties, you will obtain services as typically provided in the absence of Telehealth technology. 8. You understand that this consent will be kept in Your medical record. 9. No potential benefits from the use of Telehealth or specific results can be guaranteed. Your condition may not be cured or improved and, in some cases, may get worse.    10. There are limitations in the provision of medical care and treatment via Telehealth and the Service and you may not be able to receive diagnosis and/or treatment through the Service for every condition for which you seek diagnosis and/or treatment. 11. There are potential risks to the use of Telehealth, including but not limited to the risks described in this Telehealth Consent. 12. Your Provider(s) have discussed the use of Telehealth and the Service with you, including the benefits and risks of such and you have provided oral consent to your Provider(s) for the use of Telehealth and the Service. 15. You understand that it is your duty to provide your Provider(s) truthful, accurate and complete information, including all relevant information regarding care that you may have received or may be receiving from other healthcare providers outside of the Service. 14. You understand that each of your Provider(s) may determine in his or sole discretion that your condition is not suitable for diagnosis and/or treatment using the Service, and that you may need to seek medical care and treatment a specialist or other healthcare provider, outside of the Service. 15. You understand that you are fully responsible for payment for all services provided by Provider(s) or through use of the Service and that you may not be able to use third-party insurance. 16. You represent that (a) you have read this Telehealth Consent carefully, (b) you understand the risks and benefits of the Service and the use of Telehealth in the medical care and treatment provided to you by Provider(s) using the Service, and (c) you have the legal capacity and authority to provide this consent for yourself and/or the minor for which you are consenting under applicable federal and state laws, including laws relating to the age of [de-identified] and/or parental/guardian consent.    17. You give your informed consent to the use of Telehealth by Provider(s) using the Service under the terms described in the Terms of Service and this Telehealth Consent. The patient was read the following statement and has consented to the visit as of 3/31/22. The patient has been scheduled for their first telehealth visit on  with Dr. Katya Solares.

## 2022-07-20 SDOH — HEALTH STABILITY: PHYSICAL HEALTH: ON AVERAGE, HOW MANY DAYS PER WEEK DO YOU ENGAGE IN MODERATE TO STRENUOUS EXERCISE (LIKE A BRISK WALK)?: 2 DAYS

## 2022-07-20 SDOH — HEALTH STABILITY: PHYSICAL HEALTH: ON AVERAGE, HOW MANY MINUTES DO YOU ENGAGE IN EXERCISE AT THIS LEVEL?: 40 MIN

## 2022-07-20 ASSESSMENT — LIFESTYLE VARIABLES
HOW OFTEN DO YOU HAVE A DRINK CONTAINING ALCOHOL: 1
HOW MANY STANDARD DRINKS CONTAINING ALCOHOL DO YOU HAVE ON A TYPICAL DAY: 0
HOW OFTEN DO YOU HAVE SIX OR MORE DRINKS ON ONE OCCASION: 1
HOW MANY STANDARD DRINKS CONTAINING ALCOHOL DO YOU HAVE ON A TYPICAL DAY: PATIENT DOES NOT DRINK
HOW OFTEN DO YOU HAVE A DRINK CONTAINING ALCOHOL: NEVER

## 2022-07-27 ENCOUNTER — OFFICE VISIT (OUTPATIENT)
Dept: PRIMARY CARE CLINIC | Age: 67
End: 2022-07-27
Payer: MEDICARE

## 2022-07-27 VITALS
BODY MASS INDEX: 41.05 KG/M2 | WEIGHT: 255.4 LBS | SYSTOLIC BLOOD PRESSURE: 136 MMHG | DIASTOLIC BLOOD PRESSURE: 64 MMHG | OXYGEN SATURATION: 98 % | TEMPERATURE: 98.8 F | HEART RATE: 72 BPM | HEIGHT: 66 IN

## 2022-07-27 DIAGNOSIS — Z13.220 LIPID SCREENING: ICD-10-CM

## 2022-07-27 DIAGNOSIS — J44.9 CHRONIC OBSTRUCTIVE PULMONARY DISEASE, UNSPECIFIED COPD TYPE (HCC): ICD-10-CM

## 2022-07-27 DIAGNOSIS — Z00.00 ENCOUNTER FOR INITIAL ANNUAL WELLNESS VISIT (AWV) IN MEDICARE PATIENT: Primary | ICD-10-CM

## 2022-07-27 DIAGNOSIS — Z86.19 HISTORY OF HISTOPLASMOSIS: ICD-10-CM

## 2022-07-27 DIAGNOSIS — Z97.4 DOES USE HEARING AID: ICD-10-CM

## 2022-07-27 DIAGNOSIS — N42.9 PROSTATE DISORDER: ICD-10-CM

## 2022-07-27 DIAGNOSIS — G47.33 OBSTRUCTIVE SLEEP APNEA SYNDROME: ICD-10-CM

## 2022-07-27 DIAGNOSIS — Z13.6 SCREENING FOR AAA (ABDOMINAL AORTIC ANEURYSM): ICD-10-CM

## 2022-07-27 DIAGNOSIS — K21.9 GASTROESOPHAGEAL REFLUX DISEASE WITHOUT ESOPHAGITIS: ICD-10-CM

## 2022-07-27 DIAGNOSIS — R42 VERTIGO: ICD-10-CM

## 2022-07-27 PROBLEM — B39.2 PULMONARY HISTOPLASMOSIS GRANULOMA (HCC): Status: RESOLVED | Noted: 2020-01-21 | Resolved: 2022-07-27

## 2022-07-27 PROCEDURE — 3017F COLORECTAL CA SCREEN DOC REV: CPT | Performed by: FAMILY MEDICINE

## 2022-07-27 PROCEDURE — G0438 PPPS, INITIAL VISIT: HCPCS | Performed by: FAMILY MEDICINE

## 2022-07-27 PROCEDURE — 1123F ACP DISCUSS/DSCN MKR DOCD: CPT | Performed by: FAMILY MEDICINE

## 2022-07-27 RX ORDER — OMEPRAZOLE 20 MG/1
CAPSULE, DELAYED RELEASE ORAL
Qty: 30 CAPSULE | Refills: 0 | Status: SHIPPED | OUTPATIENT
Start: 2022-07-27

## 2022-07-27 SDOH — ECONOMIC STABILITY: TRANSPORTATION INSECURITY
IN THE PAST 12 MONTHS, HAS THE LACK OF TRANSPORTATION KEPT YOU FROM MEDICAL APPOINTMENTS OR FROM GETTING MEDICATIONS?: NO

## 2022-07-27 SDOH — ECONOMIC STABILITY: TRANSPORTATION INSECURITY
IN THE PAST 12 MONTHS, HAS LACK OF TRANSPORTATION KEPT YOU FROM MEETINGS, WORK, OR FROM GETTING THINGS NEEDED FOR DAILY LIVING?: NO

## 2022-07-27 SDOH — ECONOMIC STABILITY: INCOME INSECURITY: IN THE LAST 12 MONTHS, WAS THERE A TIME WHEN YOU WERE NOT ABLE TO PAY THE MORTGAGE OR RENT ON TIME?: NO

## 2022-07-27 SDOH — ECONOMIC STABILITY: HOUSING INSECURITY: IN THE LAST 12 MONTHS, HOW MANY PLACES HAVE YOU LIVED?: 1

## 2022-07-27 SDOH — ECONOMIC STABILITY: HOUSING INSECURITY
IN THE LAST 12 MONTHS, WAS THERE A TIME WHEN YOU DID NOT HAVE A STEADY PLACE TO SLEEP OR SLEPT IN A SHELTER (INCLUDING NOW)?: NO

## 2022-07-27 ASSESSMENT — ENCOUNTER SYMPTOMS
ROS SKIN COMMENTS: NO DERMATOLOGY
EYES NEGATIVE: 1
BACK PAIN: 0
APNEA: 1
SHORTNESS OF BREATH: 0
ABDOMINAL PAIN: 0
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
## 2022-08-02 ENCOUNTER — HOSPITAL ENCOUNTER (OUTPATIENT)
Dept: ULTRASOUND IMAGING | Age: 67
Discharge: HOME | End: 2022-08-02
Payer: MEDICARE

## 2022-08-02 DIAGNOSIS — Z13.6 SCREENING FOR AAA (ABDOMINAL AORTIC ANEURYSM): ICD-10-CM

## 2022-08-02 DIAGNOSIS — K21.9 GASTROESOPHAGEAL REFLUX DISEASE WITHOUT ESOPHAGITIS: ICD-10-CM

## 2022-08-02 DIAGNOSIS — N42.9 PROSTATE DISORDER: ICD-10-CM

## 2022-08-02 DIAGNOSIS — Z13.220 LIPID SCREENING: ICD-10-CM

## 2022-08-02 LAB
A/G RATIO: 1.4 (ref 1.1–2.2)
ALBUMIN SERPL-MCNC: 4.2 G/DL (ref 3.4–5)
ALP BLD-CCNC: 73 U/L (ref 40–129)
ALT SERPL-CCNC: 16 U/L (ref 10–40)
ANION GAP SERPL CALCULATED.3IONS-SCNC: 9 MMOL/L (ref 3–16)
AST SERPL-CCNC: 17 U/L (ref 15–37)
BASOPHILS ABSOLUTE: 0 K/UL (ref 0–0.2)
BASOPHILS RELATIVE PERCENT: 1 %
BILIRUB SERPL-MCNC: 0.4 MG/DL (ref 0–1)
BUN BLDV-MCNC: 17 MG/DL (ref 7–20)
CALCIUM SERPL-MCNC: 9.4 MG/DL (ref 8.3–10.6)
CHLORIDE BLD-SCNC: 104 MMOL/L (ref 99–110)
CHOLESTEROL, TOTAL: 215 MG/DL (ref 0–199)
CO2: 28 MMOL/L (ref 21–32)
CREAT SERPL-MCNC: 0.9 MG/DL (ref 0.8–1.3)
EOSINOPHILS ABSOLUTE: 0.2 K/UL (ref 0–0.6)
EOSINOPHILS RELATIVE PERCENT: 4 %
GFR AFRICAN AMERICAN: >60
GFR NON-AFRICAN AMERICAN: >60
GLUCOSE BLD-MCNC: 81 MG/DL (ref 70–99)
HCT VFR BLD CALC: 44 % (ref 40.5–52.5)
HDLC SERPL-MCNC: 46 MG/DL (ref 40–60)
HEMOGLOBIN: 15.1 G/DL (ref 13.5–17.5)
LDL CHOLESTEROL CALCULATED: 140 MG/DL
LYMPHOCYTES ABSOLUTE: 1.1 K/UL (ref 1–5.1)
LYMPHOCYTES RELATIVE PERCENT: 25 %
MCH RBC QN AUTO: 30.2 PG (ref 26–34)
MCHC RBC AUTO-ENTMCNC: 34.2 G/DL (ref 31–36)
MCV RBC AUTO: 88.3 FL (ref 80–100)
MONOCYTES ABSOLUTE: 0.3 K/UL (ref 0–1.3)
MONOCYTES RELATIVE PERCENT: 6.8 %
NEUTROPHILS ABSOLUTE: 2.9 K/UL (ref 1.7–7.7)
NEUTROPHILS RELATIVE PERCENT: 63.2 %
PDW BLD-RTO: 14.8 % (ref 12.4–15.4)
PLATELET # BLD: 143 K/UL (ref 135–450)
PMV BLD AUTO: 8.7 FL (ref 5–10.5)
POTASSIUM SERPL-SCNC: 4.9 MMOL/L (ref 3.5–5.1)
PROSTATE SPECIFIC ANTIGEN: 0.61 NG/ML (ref 0–4)
RBC # BLD: 4.98 M/UL (ref 4.2–5.9)
SODIUM BLD-SCNC: 141 MMOL/L (ref 136–145)
TOTAL PROTEIN: 7.2 G/DL (ref 6.4–8.2)
TRIGL SERPL-MCNC: 143 MG/DL (ref 0–150)
VLDLC SERPL CALC-MCNC: 29 MG/DL
WBC # BLD: 4.6 K/UL (ref 4–11)

## 2022-08-02 PROCEDURE — 76706 US ABDL AORTA SCREEN AAA: CPT

## 2022-09-16 ENCOUNTER — SCHEDULED TELEPHONE ENCOUNTER (OUTPATIENT)
Dept: PULMONOLOGY | Age: 67
End: 2022-09-16
Payer: MEDICARE

## 2022-09-16 ENCOUNTER — TELEPHONE (OUTPATIENT)
Dept: PULMONOLOGY | Age: 67
End: 2022-09-16

## 2022-09-16 DIAGNOSIS — G47.33 SEVERE OBSTRUCTIVE SLEEP APNEA: Primary | ICD-10-CM

## 2022-09-16 DIAGNOSIS — R53.83 OTHER FATIGUE: ICD-10-CM

## 2022-09-16 DIAGNOSIS — Z71.89 ENCOUNTER FOR BIPAP USE COUNSELING: ICD-10-CM

## 2022-09-16 PROCEDURE — 99442 PR PHYS/QHP TELEPHONE EVALUATION 11-20 MIN: CPT | Performed by: NURSE PRACTITIONER

## 2022-09-16 ASSESSMENT — SLEEP AND FATIGUE QUESTIONNAIRES
HOW LIKELY ARE YOU TO NOD OFF OR FALL ASLEEP WHILE SITTING AND READING: 0
HOW LIKELY ARE YOU TO NOD OFF OR FALL ASLEEP WHILE WATCHING TV: 2
HOW LIKELY ARE YOU TO NOD OFF OR FALL ASLEEP WHILE SITTING AND TALKING TO SOMEONE: 0
HOW LIKELY ARE YOU TO NOD OFF OR FALL ASLEEP IN A CAR, WHILE STOPPED FOR A FEW MINUTES IN TRAFFIC: 0
HOW LIKELY ARE YOU TO NOD OFF OR FALL ASLEEP WHILE SITTING QUIETLY AFTER LUNCH WITHOUT ALCOHOL: 2
HOW LIKELY ARE YOU TO NOD OFF OR FALL ASLEEP WHILE LYING DOWN TO REST IN THE AFTERNOON WHEN CIRCUMSTANCES PERMIT: 1
HOW LIKELY ARE YOU TO NOD OFF OR FALL ASLEEP WHILE SITTING INACTIVE IN A PUBLIC PLACE: 0
ESS TOTAL SCORE: 5
HOW LIKELY ARE YOU TO NOD OFF OR FALL ASLEEP WHEN YOU ARE A PASSENGER IN A CAR FOR AN HOUR WITHOUT A BREAK: 0

## 2022-09-16 NOTE — TELEPHONE ENCOUNTER
Patient needs a 6 week follow up with [address] [place] [address]  (if patient prefers Ebbie Sees will be in office 10/21/22)    Patient also needs a follow up with Dr. Dariel Bal as the referral that was rec'd in July was also for history of histoplasmosis and patient was asking about having inhalers refilled. Patient needs recall # [phone]     Patient called with message left for patient to call back to office.

## 2022-09-16 NOTE — PATIENT INSTRUCTIONS
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
## 2022-09-16 NOTE — TELEPHONE ENCOUNTER
Pt scheduled for follow up with Hank Vee CNP on 10/21/2022. Also scheduled for appt [facility] Dr Sudhir Bazzi on 9/21/2022. Offered recall number to pt who verified he had it, and will follow up with [employer] to get ETA on replacement machine.

## 2022-09-21 ENCOUNTER — OFFICE VISIT (OUTPATIENT)
Dept: PULMONOLOGY | Age: 67
End: 2022-09-21
Payer: MEDICARE

## 2022-09-21 VITALS
WEIGHT: 263.8 LBS | SYSTOLIC BLOOD PRESSURE: 139 MMHG | HEIGHT: 70 IN | RESPIRATION RATE: 18 BRPM | HEART RATE: 67 BPM | BODY MASS INDEX: 37.77 KG/M2 | DIASTOLIC BLOOD PRESSURE: 80 MMHG | OXYGEN SATURATION: 97 %

## 2022-09-21 DIAGNOSIS — J44.9 MODERATE COPD (CHRONIC OBSTRUCTIVE PULMONARY DISEASE) (HCC): ICD-10-CM

## 2022-09-21 DIAGNOSIS — J44.9 CHRONIC OBSTRUCTIVE PULMONARY DISEASE, UNSPECIFIED COPD TYPE (HCC): Primary | ICD-10-CM

## 2022-09-21 PROCEDURE — G8417 CALC BMI ABV UP PARAM F/U: HCPCS | Performed by: INTERNAL MEDICINE

## 2022-09-21 PROCEDURE — 1123F ACP DISCUSS/DSCN MKR DOCD: CPT | Performed by: INTERNAL MEDICINE

## 2022-09-21 PROCEDURE — 99214 OFFICE O/P EST MOD 30 MIN: CPT | Performed by: INTERNAL MEDICINE

## 2022-09-21 PROCEDURE — G8427 DOCREV CUR MEDS BY ELIG CLIN: HCPCS | Performed by: INTERNAL MEDICINE

## 2022-09-21 PROCEDURE — 1036F TOBACCO NON-USER: CPT | Performed by: INTERNAL MEDICINE

## 2022-09-21 PROCEDURE — 3023F SPIROM DOC REV: CPT | Performed by: INTERNAL MEDICINE

## 2022-09-21 PROCEDURE — 3017F COLORECTAL CA SCREEN DOC REV: CPT | Performed by: INTERNAL MEDICINE

## 2022-09-21 RX ORDER — ALBUTEROL SULFATE 2.5 MG/3ML
SOLUTION RESPIRATORY (INHALATION)
Qty: 375 ML | Refills: 5 | Status: SHIPPED | OUTPATIENT
Start: 2022-09-21

## 2022-09-21 NOTE — PROGRESS NOTES
P  [facility]                                               Pulmonary Clinic Consult     I had the pleasure of seeing  Brenda Luevano     Chief Complaint   Patient presents with    Follow-up     Pt requesting inhalers former Dr Cedillo pt        HISTORY OF PRESENT ILLNESS:    Brenda Luevano is a 78y.o. year old  Who start smoking at age 14  And increase gradually 1 pack and has about 36 PPY      Quit 2001   The Patient comes in with SOB that has been going on the last 10 years Associated with cough and has some wheezing.,    He/She  states that it get worse with exercise or walking long distance and he can walk . 1 block  And go 1-2 flight of stairs before get short winded    He is on diet for weight loss  On albuterol ,was incruse and had reaction and stooped     He use nebulizer asneed  Us enebs as needed 1-2 a week             ALLERGIES:    Allergies   Allergen Reactions    Adhesive Tape Other (See Comments)     blisters    Gabapentin     Incruse Ellipta [Umeclidinium Bromide]      Increase wheezing       PAST MEDICAL HISTORY:       Diagnosis Date    COPD (chronic obstructive pulmonary disease) (HCC)     Histoplasmosis     2000    HLD (hyperlipidemia)     Hypertension     Sleep apnea        MEDICATIONS:   Current Outpatient Medications   Medication Sig Dispense Refill    omeprazole (PRILOSEC) 20 MG delayed release capsule Take 1 pill in the morning 30 minutes before you take other medications or food to help with acid reflux 30 capsule 0    albuterol (PROVENTIL) (2.5 MG/3ML) 0.083% nebulizer solution USE THREE MILLILITERS VIA NEBULIZATION BY MOUTH EVERY 6 HOURS AS NEEDED FOR SHORTNESS OF BREATH 375 mL 5    albuterol sulfate HFA (PROVENTIL HFA) 108 (90 Base) MCG/ACT inhaler Inhale 2-4 puffs into the lungs every 4 hours as needed for Wheezing or Shortness of Breath 1 Inhaler 5    Umeclidinium Bromide (INCRUSE ELLIPTA) 62.5 MCG/INH AEPB 1 inhalation daily (Patient not nourished 78y.o. year old male who is alert, oriented, cooperative and in no apparent distress. Head was normocephalic and atraumatic. EENT: Mallampati class :  Extraocular muscles intact. External canals are patent and no discharge was appreciated. Septum was midline,   mucosa was without erythema, exudates or cobblestoning. No thrush was noted. Neck: Supple without thyromegaly. No elevated JVP. Trachea was midline. No carotid bruits were auscultated. Respiratory: Rhonchi bilateral    Cardiovascular: Regular without murmur, clicks, gallops or rubs. There is no left or right ventricular heave. Pulses:  Carotid, radial and femoral pulses were equally bilaterally. Abdomen: Slightly rounded and soft without organomegaly. No rebound, rigidity or guarding was appreciated. Lymphatic: No lymphadenopathy. Musculoskeletal: no Joint deformity. Extremities: no edema  Skin:  Warm and dry. Good color, turgor and pigmentation. No lesions or scars. Neurological/Psychiatric: The patient's general behavior, level of consciousness, thought content and emotional status is normal.  Cranial nerves II-XII are intact. DATA:   PFTs 11/21/18  FVC  (64%) FEV1 1.88 (53%) FEV1/FVC ratio 0.62  TLC  (71%)  RV  (%)   DLCO (78%) Bronchodilator response: no    6MWT: 1000ft, 96%       IMPRESSION:    1-Moderate severe copd   2-obstructive sleep apnea ,working with sleep clinic to get enw mask   3-high BMI   4-Possible Asthma                PLAN:      Patient doing very well as other inhalers made him worse   -continue nebs   -PFT next visit   -if breathing got worse will try other LAMA/LABA than incruse ,as he ma do better with others LAMA   -CXR       Flu and Pneumovax as per family doctor pwer request     Thank you for allowing me to participate in [facility].  I will keep following with you ,should you have any concerns ,please contact us at [facility] pulmonary office     Sincerely,        Liudmila Chapman Oral LOCKE  Pulmonary & Critical Care Medicine     NOTE: This report was transcribed using voice recognition software. Every effort was made to ensure accuracy; however, inadvertent computerized transcription errors may be present.

## 2022-10-21 ENCOUNTER — OFFICE VISIT (OUTPATIENT)
Dept: PULMONOLOGY | Age: 67
End: 2022-10-21
Payer: MEDICARE

## 2022-10-21 VITALS
RESPIRATION RATE: 20 BRPM | WEIGHT: 270.6 LBS | DIASTOLIC BLOOD PRESSURE: 88 MMHG | BODY MASS INDEX: 38.74 KG/M2 | HEIGHT: 70 IN | SYSTOLIC BLOOD PRESSURE: 135 MMHG | OXYGEN SATURATION: 97 % | HEART RATE: 64 BPM

## 2022-10-21 DIAGNOSIS — R53.83 OTHER FATIGUE: ICD-10-CM

## 2022-10-21 DIAGNOSIS — G47.10 HYPERSOMNIA: ICD-10-CM

## 2022-10-21 DIAGNOSIS — Z71.89 ENCOUNTER FOR BIPAP USE COUNSELING: ICD-10-CM

## 2022-10-21 DIAGNOSIS — G47.33 SEVERE OBSTRUCTIVE SLEEP APNEA: Primary | ICD-10-CM

## 2022-10-21 DIAGNOSIS — E66.9 OBESITY (BMI 30-39.9): ICD-10-CM

## 2022-10-21 PROCEDURE — 1036F TOBACCO NON-USER: CPT | Performed by: NURSE PRACTITIONER

## 2022-10-21 PROCEDURE — G8484 FLU IMMUNIZE NO ADMIN: HCPCS | Performed by: NURSE PRACTITIONER

## 2022-10-21 PROCEDURE — 1123F ACP DISCUSS/DSCN MKR DOCD: CPT | Performed by: NURSE PRACTITIONER

## 2022-10-21 PROCEDURE — 3017F COLORECTAL CA SCREEN DOC REV: CPT | Performed by: NURSE PRACTITIONER

## 2022-10-21 PROCEDURE — G8417 CALC BMI ABV UP PARAM F/U: HCPCS | Performed by: NURSE PRACTITIONER

## 2022-10-21 PROCEDURE — G8427 DOCREV CUR MEDS BY ELIG CLIN: HCPCS | Performed by: NURSE PRACTITIONER

## 2022-10-21 PROCEDURE — 99213 OFFICE O/P EST LOW 20 MIN: CPT | Performed by: NURSE PRACTITIONER

## 2022-10-21 ASSESSMENT — SLEEP AND FATIGUE QUESTIONNAIRES
HOW LIKELY ARE YOU TO NOD OFF OR FALL ASLEEP WHILE SITTING AND READING: 0
HOW LIKELY ARE YOU TO NOD OFF OR FALL ASLEEP WHILE WATCHING TV: 3
NECK CIRCUMFERENCE (INCHES): 18
HOW LIKELY ARE YOU TO NOD OFF OR FALL ASLEEP WHILE SITTING QUIETLY AFTER LUNCH WITHOUT ALCOHOL: 3
HOW LIKELY ARE YOU TO NOD OFF OR FALL ASLEEP WHEN YOU ARE A PASSENGER IN A CAR FOR AN HOUR WITHOUT A BREAK: 3
HOW LIKELY ARE YOU TO NOD OFF OR FALL ASLEEP IN A CAR, WHILE STOPPED FOR A FEW MINUTES IN TRAFFIC: 0
HOW LIKELY ARE YOU TO NOD OFF OR FALL ASLEEP WHILE SITTING AND TALKING TO SOMEONE: 0
HOW LIKELY ARE YOU TO NOD OFF OR FALL ASLEEP WHILE LYING DOWN TO REST IN THE AFTERNOON WHEN CIRCUMSTANCES PERMIT: 3
ESS TOTAL SCORE: 14
HOW LIKELY ARE YOU TO NOD OFF OR FALL ASLEEP WHILE SITTING INACTIVE IN A PUBLIC PLACE: 2

## 2022-10-21 NOTE — PATIENT INSTRUCTIONS
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
## 2022-12-09 ENCOUNTER — OFFICE VISIT (OUTPATIENT)
Dept: PULMONOLOGY | Age: 67
End: 2022-12-09
Payer: MEDICARE

## 2022-12-09 VITALS
OXYGEN SATURATION: 98 % | HEART RATE: 66 BPM | RESPIRATION RATE: 16 BRPM | BODY MASS INDEX: 41.32 KG/M2 | HEIGHT: 69 IN | WEIGHT: 279 LBS | DIASTOLIC BLOOD PRESSURE: 70 MMHG | SYSTOLIC BLOOD PRESSURE: 140 MMHG

## 2022-12-09 DIAGNOSIS — Z71.89 ENCOUNTER FOR BIPAP USE COUNSELING: ICD-10-CM

## 2022-12-09 DIAGNOSIS — G47.33 SEVERE OBSTRUCTIVE SLEEP APNEA: Primary | ICD-10-CM

## 2022-12-09 DIAGNOSIS — R53.83 OTHER FATIGUE: ICD-10-CM

## 2022-12-09 PROCEDURE — G8484 FLU IMMUNIZE NO ADMIN: HCPCS | Performed by: NURSE PRACTITIONER

## 2022-12-09 PROCEDURE — 3074F SYST BP LT 130 MM HG: CPT | Performed by: NURSE PRACTITIONER

## 2022-12-09 PROCEDURE — 99213 OFFICE O/P EST LOW 20 MIN: CPT | Performed by: NURSE PRACTITIONER

## 2022-12-09 PROCEDURE — 3078F DIAST BP <80 MM HG: CPT | Performed by: NURSE PRACTITIONER

## 2022-12-09 PROCEDURE — G8417 CALC BMI ABV UP PARAM F/U: HCPCS | Performed by: NURSE PRACTITIONER

## 2022-12-09 PROCEDURE — G8427 DOCREV CUR MEDS BY ELIG CLIN: HCPCS | Performed by: NURSE PRACTITIONER

## 2022-12-09 PROCEDURE — 3017F COLORECTAL CA SCREEN DOC REV: CPT | Performed by: NURSE PRACTITIONER

## 2022-12-09 PROCEDURE — 1123F ACP DISCUSS/DSCN MKR DOCD: CPT | Performed by: NURSE PRACTITIONER

## 2022-12-09 PROCEDURE — 1036F TOBACCO NON-USER: CPT | Performed by: NURSE PRACTITIONER

## 2022-12-09 ASSESSMENT — SLEEP AND FATIGUE QUESTIONNAIRES
HOW LIKELY ARE YOU TO NOD OFF OR FALL ASLEEP WHEN YOU ARE A PASSENGER IN A CAR FOR AN HOUR WITHOUT A BREAK: 0
HOW LIKELY ARE YOU TO NOD OFF OR FALL ASLEEP WHILE SITTING INACTIVE IN A PUBLIC PLACE: 0
HOW LIKELY ARE YOU TO NOD OFF OR FALL ASLEEP WHILE WATCHING TV: 2
HOW LIKELY ARE YOU TO NOD OFF OR FALL ASLEEP WHILE SITTING AND TALKING TO SOMEONE: 0
HOW LIKELY ARE YOU TO NOD OFF OR FALL ASLEEP WHILE SITTING AND READING: 2
HOW LIKELY ARE YOU TO NOD OFF OR FALL ASLEEP IN A CAR, WHILE STOPPED FOR A FEW MINUTES IN TRAFFIC: 0
HOW LIKELY ARE YOU TO NOD OFF OR FALL ASLEEP WHILE SITTING QUIETLY AFTER LUNCH WITHOUT ALCOHOL: 2
ESS TOTAL SCORE: 7
NECK CIRCUMFERENCE (INCHES): 20
HOW LIKELY ARE YOU TO NOD OFF OR FALL ASLEEP WHILE LYING DOWN TO REST IN THE AFTERNOON WHEN CIRCUMSTANCES PERMIT: 1
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
## 2023-01-19 ENCOUNTER — TELEPHONE (OUTPATIENT)
Dept: PULMONOLOGY | Age: 68
End: 2023-01-19

## 2023-01-19 NOTE — TELEPHONE ENCOUNTER
Lm for pt to cb and schedule. canceled for Rabia White ([id number])  Visit Type: OFFICE VISIT  Date        Time      Length    Provider                  Department  3/20/2023    9:00 AM  20 mins.   AKILA Chun - CNP  [facility]     Reason for Cancellation: Error     Patient Comments: Other appointment scheduled on same date with other doctor

## 2023-02-10 ENCOUNTER — HOSPITAL ENCOUNTER (OUTPATIENT)
Dept: GENERAL RADIOLOGY | Age: 68
Discharge: HOME | End: 2023-02-10
Payer: MEDICARE

## 2023-02-10 ENCOUNTER — OFFICE VISIT (OUTPATIENT)
Dept: PRIMARY CARE CLINIC | Age: 68
End: 2023-02-10
Payer: MEDICARE

## 2023-02-10 VITALS
OXYGEN SATURATION: 95 % | WEIGHT: 283 LBS | RESPIRATION RATE: 17 BRPM | SYSTOLIC BLOOD PRESSURE: 150 MMHG | DIASTOLIC BLOOD PRESSURE: 80 MMHG | HEART RATE: 73 BPM | HEIGHT: 69 IN | TEMPERATURE: 97.1 F | BODY MASS INDEX: 41.92 KG/M2

## 2023-02-10 DIAGNOSIS — E66.01 OBESITY, CLASS III, BMI 40-49.9 (MORBID OBESITY) (HCC): ICD-10-CM

## 2023-02-10 DIAGNOSIS — J44.9 CHRONIC OBSTRUCTIVE PULMONARY DISEASE, UNSPECIFIED COPD TYPE (HCC): ICD-10-CM

## 2023-02-10 DIAGNOSIS — I10 HYPERTENSION, UNSPECIFIED TYPE: Primary | ICD-10-CM

## 2023-02-10 DIAGNOSIS — M79.89 LEG SWELLING: ICD-10-CM

## 2023-02-10 DIAGNOSIS — R94.31 ABNORMAL EKG: ICD-10-CM

## 2023-02-10 PROCEDURE — G8484 FLU IMMUNIZE NO ADMIN: HCPCS | Performed by: FAMILY MEDICINE

## 2023-02-10 PROCEDURE — 1036F TOBACCO NON-USER: CPT | Performed by: FAMILY MEDICINE

## 2023-02-10 PROCEDURE — G8417 CALC BMI ABV UP PARAM F/U: HCPCS | Performed by: FAMILY MEDICINE

## 2023-02-10 PROCEDURE — 93000 ELECTROCARDIOGRAM COMPLETE: CPT | Performed by: FAMILY MEDICINE

## 2023-02-10 PROCEDURE — 3078F DIAST BP <80 MM HG: CPT | Performed by: FAMILY MEDICINE

## 2023-02-10 PROCEDURE — G8427 DOCREV CUR MEDS BY ELIG CLIN: HCPCS | Performed by: FAMILY MEDICINE

## 2023-02-10 PROCEDURE — 3023F SPIROM DOC REV: CPT | Performed by: FAMILY MEDICINE

## 2023-02-10 PROCEDURE — 3074F SYST BP LT 130 MM HG: CPT | Performed by: FAMILY MEDICINE

## 2023-02-10 PROCEDURE — 99214 OFFICE O/P EST MOD 30 MIN: CPT | Performed by: FAMILY MEDICINE

## 2023-02-10 PROCEDURE — 1123F ACP DISCUSS/DSCN MKR DOCD: CPT | Performed by: FAMILY MEDICINE

## 2023-02-10 PROCEDURE — 3017F COLORECTAL CA SCREEN DOC REV: CPT | Performed by: FAMILY MEDICINE

## 2023-02-10 PROCEDURE — 71046 X-RAY EXAM CHEST 2 VIEWS: CPT

## 2023-02-10 RX ORDER — LOSARTAN POTASSIUM AND HYDROCHLOROTHIAZIDE 12.5; 5 MG/1; MG/1
1 TABLET ORAL DAILY
Qty: 30 TABLET | Refills: 3 | Status: SHIPPED | OUTPATIENT
Start: 2023-02-10

## 2023-02-10 ASSESSMENT — PATIENT HEALTH QUESTIONNAIRE - PHQ9
SUM OF ALL RESPONSES TO PHQ9 QUESTIONS 1 & 2: 0
8. MOVING OR SPEAKING SO SLOWLY THAT OTHER PEOPLE COULD HAVE NOTICED. OR THE OPPOSITE, BEING SO FIGETY OR RESTLESS THAT YOU HAVE BEEN MOVING AROUND A LOT MORE THAN USUAL: 0
SUM OF ALL RESPONSES TO PHQ QUESTIONS 1-9: 0
10. IF YOU CHECKED OFF ANY PROBLEMS, HOW DIFFICULT HAVE THESE PROBLEMS MADE IT FOR YOU TO DO YOUR WORK, TAKE CARE OF THINGS AT HOME, OR GET ALONG WITH OTHER PEOPLE: 0
7. TROUBLE CONCENTRATING ON THINGS, SUCH AS READING THE NEWSPAPER OR WATCHING TELEVISION: 0
SUM OF ALL RESPONSES TO PHQ QUESTIONS 1-9: 0
9. THOUGHTS THAT YOU WOULD BE BETTER OFF DEAD, OR OF HURTING YOURSELF: 0
SUM OF ALL RESPONSES TO PHQ QUESTIONS 1-9: 0
3. TROUBLE FALLING OR STAYING ASLEEP: 0
2. FEELING DOWN, DEPRESSED OR HOPELESS: 0
1. LITTLE INTEREST OR PLEASURE IN DOING THINGS: 0
SUM OF ALL RESPONSES TO PHQ QUESTIONS 1-9: 0
6. FEELING BAD ABOUT YOURSELF - OR THAT YOU ARE A FAILURE OR HAVE LET YOURSELF OR YOUR FAMILY DOWN: 0
4. FEELING TIRED OR HAVING LITTLE ENERGY: 0
5. POOR APPETITE OR OVEREATING: 0

## 2023-02-10 NOTE — PROGRESS NOTES
SUBJECTIVE:  Patient ID: Shelley [facility] is a 78 y.o. male. Chief Complaint:  Chief Complaint   Patient presents with    Foot Swelling    Leg Swelling       HPI  78year old Male  Hypertension No current Rx   Pt used take Rx Lisinopril but D/C on his own   Noticed ankle swelling x few week  Denies Short of breath  Not sure about Chest pain    Past Medical History:   Diagnosis Date    COPD (chronic obstructive pulmonary disease) ([address]     Histoplasmosis     2000    HLD (hyperlipidemia)     Hypertension     Sleep apnea      Past Surgical History:   Procedure Laterality Date    EYE MUSCLE SURGERY      as child    OTHER SURGICAL HISTORY      PE tubes bilat. UPPER GASTROINTESTINAL ENDOSCOPY      Approx 9 years ago    UPPER GASTROINTESTINAL ENDOSCOPY  04/29/2012    foreign body     Allergies   Allergen Reactions    Adhesive Tape Other (See Comments)     blisters    Gabapentin     Incruse Ellipta [Umeclidinium Bromide]      Increase wheezing       Family History   Problem Relation Age of Onset    Heart Disease Mother         age 80    Other Father         don't know well    Heart Disease Father         early [de-identified]'    COPD Brother         age 72     Social History     Social History Narrative        2 grown son Live with them    Son + chronic back    Son +MS    Retired    Quit tobacco 2001 after Tobacco use 15 years        Patient Active Problem List   Diagnosis    HTN (hypertension)    GERD with stricture    Hearing loss    Hyperlipidemia    BMI 40.0-44.9, adult (HCC)    Severe obstructive sleep apnea    Obesity (BMI 30-39. 9)     Current Outpatient Medications   Medication Sig Dispense Refill    albuterol (PROVENTIL) (2.5 MG/3ML) 0.083% nebulizer solution USE THREE MILLILITERS VIA NEBULIZATION BY MOUTH EVERY 6 HOURS AS NEEDED FOR SHORTNESS OF BREATH 375 mL 5    omeprazole (PRILOSEC) 20 MG delayed release capsule Take 1 pill in the morning 30 minutes before you take other medications or food to help with acid reflux 30 capsule 0    sildenafil (VIAGRA) 50 MG tablet TAKE ONE TABLET BY MOUTH AS NEEDED FOR ERECTILE DYSFUNCTION 9 tablet 2     No current facility-administered medications for this visit. Lab Results   Component Value Date    WBC 4.6 08/02/2022    HGB 15.1 08/02/2022    HCT 44.0 08/02/2022    MCV 88.3 08/02/2022     08/02/2022     Lab Results   Component Value Date    CHOL 215 (H) 08/02/2022    CHOL 204 (H) 10/09/2020    CHOL 210 (H) 08/16/2016     Lab Results   Component Value Date    TRIG 143 08/02/2022    TRIG 82 10/09/2020    TRIG 136 08/16/2016     Lab Results   Component Value Date    HDL 46 08/02/2022    HDL 54 10/09/2020    HDL 42 08/16/2016     Lab Results   Component Value Date    LDLCALC 140 (H) 08/02/2022    LDLCALC 134 (H) 10/09/2020    LDLCALC 141 (H) 08/16/2016     Lab Results   Component Value Date    LABVLDL 29 08/02/2022    LABVLDL 16 10/09/2020    LABVLDL 27 08/16/2016     No results found for: [facility]    Chemistry        Component Value Date/Time     08/02/2022 0833    K 4.9 08/02/2022 0833     08/02/2022 0833    CO2 28 08/02/2022 0833    BUN 17 08/02/2022 0833    CREATININE 0.9 08/02/2022 0833        Component Value Date/Time    CALCIUM 9.4 08/02/2022 0833    ALKPHOS 73 08/02/2022 0833    AST 17 08/02/2022 0833    ALT 16 08/02/2022 0833    BILITOT 0.4 08/02/2022 0833        Lab Results   Component Value Date    PSA 0.61 08/02/2022     Hemoglobin A1C   Date Value Ref Range Status   10/09/2020 4.9 See comment % Final     Comment:     Comment:  Diagnosis of Diabetes: > or = 6.5%  Increased risk of diabetes (Prediabetes): 5.7-6.4%  Glycemic Control: Nonpregnant Adults: <7.0%                    Pregnant: <6.0%         Lab Results   Component Value Date    TSH 1.59 10/09/2020           Review of Systems   Constitutional:  Negative for chills and fever.         Stay in House most of time  Weight issue  Go to store  Play Piano  Help younger son with medical visit Dx MS remission   HENT:  Negative for congestion. Eyes: Negative. Respiratory:  Positive for apnea and shortness of breath. Negative for cough, choking and wheezing. No Tobacco Quit 2001  Sleep Apnea  CPAP/Bipap  Visit next month & due reevaluation  ? Dx COPD  [facility] area   Cardiovascular:  Positive for leg swelling. Gastrointestinal:  Negative for abdominal pain. Endocrine: Negative. Genitourinary:  Negative for dysuria and flank pain. Musculoskeletal:  Positive for gait problem. Negative for neck pain. Skin:  Negative for rash. Neurological:  Negative for dizziness, light-headedness and headaches. Psychiatric/Behavioral: Negative. Negative for behavioral problems, confusion, self-injury, sleep disturbance and suicidal ideas. The patient is not nervous/anxious. OBJECTIVE:  BP (!) 150/80   Pulse 73   Temp 97.1 °F (36.2 °C)   Resp 17   Ht 5' 9\" (1.753 m)   Wt 283 lb (128.4 kg)   SpO2 95%   BMI 41.79 kg/m²   Physical Exam  Constitutional:       Comments: BMI 41   HENT:      Head: Normocephalic. Right Ear: Tympanic membrane normal.      Left Ear: Tympanic membrane normal.   Eyes:      Extraocular Movements: Extraocular movements intact. Pupils: Pupils are equal, round, and reactive to light. Cardiovascular:      Rate and Rhythm: Normal rate and regular rhythm. Pulses: Normal pulses. Heart sounds: Normal heart sounds. Comments: EKG Abnormal RBBB  Pulmonary:      Effort: No respiratory distress. Breath sounds: No wheezing or rhonchi. Musculoskeletal:      Cervical back: Normal range of motion and neck supple. No rigidity or tenderness. Right lower leg: Edema present. Left lower leg: Edema present. Comments: Mild edema around ankle   Lymphadenopathy:      Cervical: No cervical adenopathy. Neurological:      General: No focal deficit present. Mental Status: He is alert and oriented to person, place, and time. Psychiatric:         Behavior: Behavior normal.         Thought Content: Thought content normal.         Judgment: Judgment normal.       ASSESSMENT/PLAN:      Diagnosis Orders   1. Hypertension, unspecified type  EKG 12 Lead - Clinic Performed    [facility]      2. Leg swelling  EKG 12 Lead - Clinic Performed    [facility]ans[facility]      3. Chronic obstructive pulmonary disease, unspecified COPD type ([facility] [address]  XR CHEST STANDARD (2 VW)    Joni Murray MD, [facility]      4. Abnormal EKG  [facility]is Gans[facility]      5.  Obesity, Class III, BMI 40-49.9 (morbid obesity) ([facility]            Hypertension aware of BP reading & need for close monitor & FU  start Rx Losartan-Hctz 50-12.5  Covid shot initial  No recent booster  No Flu shot  EKG Abnormal  Get Cardiology consult info given   Get CXR

## 2023-02-13 ASSESSMENT — ENCOUNTER SYMPTOMS
SHORTNESS OF BREATH: 1
APNEA: 1
ABDOMINAL PAIN: 0
EYES NEGATIVE: 1
CHOKING: 0
WHEEZING: 0
COUGH: 0

## 2023-03-13 ENCOUNTER — HOSPITAL ENCOUNTER (EMERGENCY)
Age: 68
Discharge: HOME | End: 2023-03-13
Payer: MEDICARE

## 2023-03-13 VITALS
DIASTOLIC BLOOD PRESSURE: 58 MMHG | RESPIRATION RATE: 20 BRPM | TEMPERATURE: 98 F | HEART RATE: 91 BPM | SYSTOLIC BLOOD PRESSURE: 113 MMHG | OXYGEN SATURATION: 100 % | HEIGHT: 70 IN | BODY MASS INDEX: 38.65 KG/M2 | WEIGHT: 270 LBS

## 2023-03-13 DIAGNOSIS — T18.128A FOOD IMPACTION OF ESOPHAGUS, INITIAL ENCOUNTER: Primary | ICD-10-CM

## 2023-03-13 LAB
A/G RATIO: 1.3 (ref 1.1–2.2)
ALBUMIN SERPL-MCNC: 4.3 G/DL (ref 3.4–5)
ALP BLD-CCNC: 62 U/L (ref 40–129)
ALT SERPL-CCNC: 24 U/L (ref 10–40)
ANION GAP SERPL CALCULATED.3IONS-SCNC: 11 MMOL/L (ref 3–16)
AST SERPL-CCNC: 40 U/L (ref 15–37)
BASOPHILS ABSOLUTE: 0.1 K/UL (ref 0–0.2)
BASOPHILS RELATIVE PERCENT: 0.7 %
BILIRUB SERPL-MCNC: 0.5 MG/DL (ref 0–1)
BUN BLDV-MCNC: 19 MG/DL (ref 7–20)
CALCIUM SERPL-MCNC: 9.4 MG/DL (ref 8.3–10.6)
CHLORIDE BLD-SCNC: 98 MMOL/L (ref 99–110)
CO2: 24 MMOL/L (ref 21–32)
CREAT SERPL-MCNC: 0.8 MG/DL (ref 0.8–1.3)
EOSINOPHILS ABSOLUTE: 0.1 K/UL (ref 0–0.6)
EOSINOPHILS RELATIVE PERCENT: 1.8 %
GFR SERPL CREATININE-BSD FRML MDRD: >60 ML/MIN/{1.73_M2}
GLUCOSE BLD-MCNC: 95 MG/DL (ref 70–99)
HCT VFR BLD CALC: 44 % (ref 40.5–52.5)
HEMOGLOBIN: 15.2 G/DL (ref 13.5–17.5)
LYMPHOCYTES ABSOLUTE: 1 K/UL (ref 1–5.1)
LYMPHOCYTES RELATIVE PERCENT: 13.3 %
MCH RBC QN AUTO: 30.6 PG (ref 26–34)
MCHC RBC AUTO-ENTMCNC: 34.7 G/DL (ref 31–36)
MCV RBC AUTO: 88.2 FL (ref 80–100)
MONOCYTES ABSOLUTE: 0.6 K/UL (ref 0–1.3)
MONOCYTES RELATIVE PERCENT: 7.2 %
NEUTROPHILS ABSOLUTE: 5.9 K/UL (ref 1.7–7.7)
NEUTROPHILS RELATIVE PERCENT: 77 %
PDW BLD-RTO: 13.5 % (ref 12.4–15.4)
PLATELET # BLD: 241 K/UL (ref 135–450)
PMV BLD AUTO: 9.4 FL (ref 5–10.5)
POTASSIUM REFLEX MAGNESIUM: 5.5 MMOL/L (ref 3.5–5.1)
POTASSIUM SERPL-SCNC: 3.7 MMOL/L (ref 3.5–5.1)
POTASSIUM SERPL-SCNC: 5.4 MMOL/L (ref 3.5–5.1)
RBC # BLD: 4.98 M/UL (ref 4.2–5.9)
SODIUM BLD-SCNC: 133 MMOL/L (ref 136–145)
TOTAL PROTEIN: 7.7 G/DL (ref 6.4–8.2)
WBC # BLD: 7.7 K/UL (ref 4–11)

## 2023-03-13 PROCEDURE — 85025 COMPLETE CBC W/AUTO DIFF WBC: CPT

## 2023-03-13 PROCEDURE — 2500000003 HC RX 250 WO HCPCS: Performed by: NURSE PRACTITIONER

## 2023-03-13 PROCEDURE — 7100000010 HC PHASE II RECOVERY - FIRST 15 MIN: Performed by: INTERNAL MEDICINE

## 2023-03-13 PROCEDURE — C9113 INJ PANTOPRAZOLE SODIUM, VIA: HCPCS | Performed by: NURSE PRACTITIONER

## 2023-03-13 PROCEDURE — 2580000003 HC RX 258: Performed by: NURSE PRACTITIONER

## 2023-03-13 PROCEDURE — 96375 TX/PRO/DX INJ NEW DRUG ADDON: CPT

## 2023-03-13 PROCEDURE — 80053 COMPREHEN METABOLIC PANEL: CPT

## 2023-03-13 PROCEDURE — 2709999900 HC NON-CHARGEABLE SUPPLY: Performed by: INTERNAL MEDICINE

## 2023-03-13 PROCEDURE — 6360000002 HC RX W HCPCS: Performed by: NURSE PRACTITIONER

## 2023-03-13 PROCEDURE — 99284 EMERGENCY DEPT VISIT MOD MDM: CPT

## 2023-03-13 PROCEDURE — 3609012900 HC EGD FOREIGN BODY REMOVAL: Performed by: INTERNAL MEDICINE

## 2023-03-13 PROCEDURE — 36415 COLL VENOUS BLD VENIPUNCTURE: CPT

## 2023-03-13 PROCEDURE — 6360000002 HC RX W HCPCS: Performed by: INTERNAL MEDICINE

## 2023-03-13 PROCEDURE — 96374 THER/PROPH/DIAG INJ IV PUSH: CPT

## 2023-03-13 PROCEDURE — 84132 ASSAY OF SERUM POTASSIUM: CPT

## 2023-03-13 RX ORDER — OMEPRAZOLE 20 MG/1
20 CAPSULE, DELAYED RELEASE ORAL DAILY
Qty: 180 CAPSULE | Refills: 1 | Status: SHIPPED | OUTPATIENT
Start: 2023-03-13

## 2023-03-13 RX ORDER — 0.9 % SODIUM CHLORIDE 0.9 %
1000 INTRAVENOUS SOLUTION INTRAVENOUS ONCE
Status: COMPLETED | OUTPATIENT
Start: 2023-03-13 | End: 2023-03-13

## 2023-03-13 RX ORDER — PANTOPRAZOLE SODIUM 40 MG/10ML
40 INJECTION, POWDER, LYOPHILIZED, FOR SOLUTION INTRAVENOUS ONCE
Status: COMPLETED | OUTPATIENT
Start: 2023-03-13 | End: 2023-03-13

## 2023-03-13 RX ORDER — MIDAZOLAM HYDROCHLORIDE 5 MG/ML
INJECTION INTRAMUSCULAR; INTRAVENOUS PRN
Status: DISCONTINUED | OUTPATIENT
Start: 2023-03-13 | End: 2023-03-13 | Stop reason: ALTCHOICE

## 2023-03-13 RX ORDER — FENTANYL CITRATE 50 UG/ML
INJECTION, SOLUTION INTRAMUSCULAR; INTRAVENOUS PRN
Status: DISCONTINUED | OUTPATIENT
Start: 2023-03-13 | End: 2023-03-13 | Stop reason: ALTCHOICE

## 2023-03-13 RX ORDER — ONDANSETRON 2 MG/ML
4 INJECTION INTRAMUSCULAR; INTRAVENOUS ONCE
Status: COMPLETED | OUTPATIENT
Start: 2023-03-13 | End: 2023-03-13

## 2023-03-13 RX ORDER — WATER 1000 ML/1000ML
INJECTION, SOLUTION INTRAVENOUS
Status: DISCONTINUED
Start: 2023-03-13 | End: 2023-03-13 | Stop reason: HOSPADM

## 2023-03-13 RX ADMIN — PANTOPRAZOLE SODIUM 40 MG: 40 INJECTION, POWDER, FOR SOLUTION INTRAVENOUS at 14:29

## 2023-03-13 RX ADMIN — GLUCAGON HYDROCHLORIDE 1 MG: 1 INJECTION, POWDER, FOR SOLUTION INTRAMUSCULAR; INTRAVENOUS; SUBCUTANEOUS at 14:32

## 2023-03-13 RX ADMIN — ONDANSETRON HYDROCHLORIDE 4 MG: 2 INJECTION, SOLUTION INTRAMUSCULAR; INTRAVENOUS at 14:28

## 2023-03-13 RX ADMIN — SODIUM CHLORIDE 1000 ML: 9 INJECTION, SOLUTION INTRAVENOUS at 18:00

## 2023-03-13 ASSESSMENT — ENCOUNTER SYMPTOMS
NAUSEA: 1
ABDOMINAL PAIN: 0
SHORTNESS OF BREATH: 0
RHINORRHEA: 0
VOMITING: 1
COLOR CHANGE: 0
FACIAL SWELLING: 0
SORE THROAT: 0

## 2023-03-13 ASSESSMENT — PAIN - FUNCTIONAL ASSESSMENT
PAIN_FUNCTIONAL_ASSESSMENT: NONE - DENIES PAIN
PAIN_FUNCTIONAL_ASSESSMENT: 0-10
PAIN_FUNCTIONAL_ASSESSMENT: NONE - DENIES PAIN
PAIN_FUNCTIONAL_ASSESSMENT: NONE - DENIES PAIN

## 2023-03-13 ASSESSMENT — PAIN DESCRIPTION - LOCATION: LOCATION: THROAT

## 2023-03-13 ASSESSMENT — PAIN SCALES - GENERAL: PAINLEVEL_OUTOF10: 4

## 2023-03-13 ASSESSMENT — LIFESTYLE VARIABLES
HOW MANY STANDARD DRINKS CONTAINING ALCOHOL DO YOU HAVE ON A TYPICAL DAY: PATIENT DOES NOT DRINK
HOW OFTEN DO YOU HAVE A DRINK CONTAINING ALCOHOL: NEVER

## 2023-03-13 NOTE — ED NOTES
Pt states he had chicken this am around 0530. States the piece of chicken got stuck and is unable to swallow since. Pt states he has a hx of esophageal stricture in the past and has had been stretched several times in the past, the last one being four years ago. Tyler Alarcon NP at bedside.       Stefany Johnson RN  03/13/23 0111

## 2023-03-13 NOTE — OP NOTE
[address]                 [address] [facility] [address]                                OPERATIVE REPORT    PATIENT NAME: Valentino Pali                    :        1955  MED REC NO:   [id number]                          ROOM:       15  ACCOUNT NO:   [de-identified]                           ADMIT DATE: 2023  PROVIDER:     Ada Le MD    DATE OF PROCEDURE:  2023    PREPROCEDURE DIAGNOSIS:  Food impaction. PROCEDURE PERFORMED:  EGD with foreign body retrieval.    POSTPROCEDURE DIAGNOSES:  1.  Large food bolus impacted in the distal esophagus. 2.  Successful foreign body retrieval with RescueNet. 3.  Esophageal stricture with inflammation. 4.  Small hiatal hernia. PROCEDURE INDICATIONS:  This is a 72-year-old male with a history of  hypertension, hyperlipidemia, COPD, obstructive sleep apnea, obesity,  esophageal stricture, and previous food impactions, presented to the  emergency room with acute food impaction. He developed acute onset of  some dysphagia after consuming chicken earlier this morning. He is  unable to tolerate any oral diet including his own secretions. EGD is  being performed for therapeutic purposes. He does have previous history  of esophageal stricture, requiring dilation. MEDICATIONS:  Versed 7 mg, fentanyl 100 mcg. PROCEDURE IN DETAIL:  Informed consent was obtained after discussing  risks, benefits, and alternatives. A full history and physical was  performed. The patient was classified as ASA class III. Medications  were sequentially given to achieve adequate sedation. The  cardiopulmonary status was continuously monitored throughout the  procedure. The patient was placed in left lateral decubitus position. Once adequately sedated, a standard upper gastroscope was inserted in  the mouth and advanced under direct visualization to the second portion  of the duodenum.   The entire mucosa of the esophagus, stomach  (retroflexion and forward views), duodenum (bulb, sweep, and second  portion) were examined carefully during withdrawal.  The patient  tolerated the procedure well without any difficulties. FINDINGS:    ESOPHAGUS:  There was a large amount of food impacted in the distal  esophagus. This was successfully retrieved using RescueNet. Upon  removal, the remaining residual impaction spontaneously passed into the  stomach. Upon clearance of the esophagus, there was moderately inflamed  esophageal stricture identified in the distal esophagus. There was a  small hiatal hernia in the distal esophagus as well. STOMACH:  The entire stomach appeared normal, both in forward and  retroflexed views. DUODENUM:  Examined portion of the duodenum appeared normal.    SUMMARY:  1.  Large food bolus impaction in the distal esophagus. 2.  Successful foreign body retrieval with RescueNet. 3.  Esophageal stricture and hiatal hernia. RECOMMENDATIONS:  1. Discharge the patient to emergency room and home afterwards. 2.  Continue PPI daily. 3.  Repeat EGD with dilation in 2 to 4 weeks once acute inflammation  resolves.     EBL: <5mL    Dylan Erickson MD    D: 03/13/2023 15:37:44       T: 03/13/2023 15:40:16     [email or web address]  Job#: [id number]     Doc#: [id number]    CC:  MD Shorty Howard MD

## 2023-03-13 NOTE — BRIEF OP NOTE
Brief Postoperative Note      Patient: Armani Hughes  YOB: 1955  MRN: [id number]    Date of Procedure: 3/13/2023    Pre-Op Diagnosis: FOOD IMPACTION    Post-Op Diagnosis:  food impaction s/p FBR, esophageal stricture, hiatal hernia       Procedure(s):  EGD    Surgeon(s):  Mariusz Barkley MD    Assistant:  * No surgical staff found *    Anesthesia: Monitor Anesthesia Care    Estimated Blood Loss (mL): Minimal    Complications: None    Specimens:   * No specimens in log *    Implants:  * No implants in log *      Drains: * No LDAs found *    Findings: food impaction s/p FBR, esophageal stricture, hiatal hernia    Recommendation  Continue PPI  Outpatient EGD with dil in 2wks  Ok to d/c from GI standpoint    Electronically signed by Mariusz Barkley MD on 3/13/2023 at 3:28 PM

## 2023-03-13 NOTE — ED PROVIDER NOTES
[address] ED  EMERGENCY DEPARTMENT ENCOUNTER      I am the Primary Clinician of Record    Note started: 2:10 PM EDT 3/13/23    ALEENA. I have evaluated this patient. My supervising physician was available for consultation. Pt Name: Jolanda Harada  MRN: [id number]  [place] 1955  Dateof evaluation: 3/13/2023  Provider: Catrachito Kaba, APRN - CNP  PCP: Laveren Garcia MD  ED Attending: No att. providers found      [address] [place] [address]       Chief Complaint   Patient presents with    Emesis     \"Vomiting since 0530, history of small esophagus, \"feel like I am choking. \" + actively vomiting during triage       HISTORY OF PRESENTILLNESS   (Location/Symptom, Timing/Onset, Context/Setting, Quality, Duration, Modifying Factors, Severity)  Note limiting factors. Jolanda Harada is a 78 y.o. male for nausea and vomiting. Onset was this morning at 530. Context includes patient reports that he ate chicken this morning and has been vomiting since. Patient reports that he has had esophageal foreign bodies in the past.  He follows with . Alleviating factors include nothing. Aggravating factors include nothing. Pain is 4/10. nothing has been used for pain today. Nursing Notes were all reviewed and agreed with or any disagreements were addressed  in the HPI. REVIEW OF SYSTEMS       Review of Systems   Constitutional:  Negative for activity change, appetite change and fever. HENT:  Negative for congestion, facial swelling, rhinorrhea and sore throat. Eyes:  Negative for visual disturbance. Respiratory:  Negative for shortness of breath. Cardiovascular:  Negative for chest pain. Gastrointestinal:  Positive for nausea and vomiting. Negative for abdominal pain. Concerns for esophageal foreign body   Genitourinary:  Negative for difficulty urinating. Musculoskeletal:  Negative for arthralgias and myalgias. Skin:  Negative for color change and rash.   Neurological:  Negative for dizziness and light-headedness.   Psychiatric/Behavioral:  Negative for agitation.    All other systems reviewed and are negative.    Positives and Pertinent negatives as per HPI.  Except as noted above in the ROS, all other systems were reviewed and negative.       PAST MEDICAL HISTORY     Past Medical History:   Diagnosis Date    COPD (chronic obstructive pulmonary disease) (HCC)     Histoplasmosis     2000    HLD (hyperlipidemia)     Hypertension     Sleep apnea          SURGICAL HISTORY       Past Surgical History:   Procedure Laterality Date    EYE MUSCLE SURGERY      as child    OTHER SURGICAL HISTORY      PE tubes bilat.    UPPER GASTROINTESTINAL ENDOSCOPY      Approx 9 years ago    UPPER GASTROINTESTINAL ENDOSCOPY  04/29/2012    foreign body         CURRENT MEDICATIONS       Previous Medications    ALBUTEROL (PROVENTIL) (2.5 MG/3ML) 0.083% NEBULIZER SOLUTION    USE THREE MILLILITERS VIA NEBULIZATION BY MOUTH EVERY 6 HOURS AS NEEDED FOR SHORTNESS OF BREATH    LOSARTAN-HYDROCHLOROTHIAZIDE (HYZAAR) 50-12.5 MG PER TABLET    Take 1 tablet by mouth daily    SILDENAFIL (VIAGRA) 50 MG TABLET    TAKE ONE TABLET BY MOUTH AS NEEDED FOR ERECTILE DYSFUNCTION         ALLERGIES     Adhesive tape, Gabapentin, and Incruse ellipta [umeclidinium bromide]      FAMILY HISTORY       Family History   Problem Relation Age of Onset    Heart Disease Mother         age 82    Other Father         don't know well    Heart Disease Father         early 80'    COPD Brother         age 65          SOCIAL HISTORY       Social History     Socioeconomic History    Marital status:      Spouse name: Zabrina    Number of children: 2    Years of education: None    Highest education level: None   Occupational History     Comment: retired     Comment: [employer] work   Tobacco Use    Smoking status: Former     Packs/day: 1.50     Years: 20.00     Pack years: 30.00     Types: Cigarettes     Quit date: 2001     Years  since quittin.2    Smokeless tobacco: Never   Vaping Use    Vaping Use: Never used   Substance and Sexual Activity    Alcohol use: No     Alcohol/week: 0.0 standard drinks    Drug use: No    Sexual activity: Yes     Partners: Female     Comment: M  2 sons   Social History Narrative        2 grown son Live with them    Son + chronic back    Son +MS    Retired    Quit tobacco  after Tobacco use 15 years      Social Determinants of Health     Financial Resource Strain: Low Risk     Difficulty of Paying Living Expenses: Not hard at all   Food Insecurity: No Food Insecurity    Worried About Running Out of Food in the Last Year: Never true    Ran Out of Food in the Last Year: Never true   Transportation Needs: No Transportation Needs    Lack of Transportation (Medical): No    Lack of Transportation (Non-Medical): No   Physical Activity: Insufficiently Active    Days of Exercise per Week: 2 days    Minutes of Exercise per Session: 40 min   Stress: No Stress Concern Present    Feeling of Stress : Not at all   Housing Stability: Low Risk     Unable to Pay for Housing in the Last Year: No    Number of Places Lived in the Last Year: 1    Unstable Housing in the Last Year: No         SCREENINGS    Harwood Coma Scale  Eye Opening: Spontaneous  Best Verbal Response: Oriented  Best Motor Response: Obeys commands  Harwood Coma Scale Score: 15      CIWA Assessment  BP: (!) 113/58  Heart Rate: 84          PHYSICAL EXAM     ED Triage Vitals [23 1356]   BP Temp Temp Source Heart Rate Resp SpO2 Height Weight   (!) 186/97 98.6 °F (37 °C) Oral 85 16 99 % 5' 10\" (1.778 m) 270 lb (122.5 kg)       Physical Exam  Constitutional:       Appearance: Normal appearance. He is well-developed.   HENT:      Head: Normocephalic and atraumatic.   Cardiovascular:      Rate and Rhythm: Normal rate.   Pulmonary:      Effort: Pulmonary effort is normal. No respiratory distress.   Abdominal:      General: There is no distension.       Palpations: Abdomen is soft. Tenderness: There is no abdominal tenderness. Comments: Patient actively vomiting at time of exam   Musculoskeletal:         General: Normal range of motion. Cervical back: Normal range of motion. Skin:     General: Skin is warm and dry. Neurological:      Mental Status: He is alert and oriented to person, place, and time. DIAGNOSTIC RESULTS   LABS:    Labs Reviewed   COMPREHENSIVE METABOLIC PANEL W/ REFLEX TO MG FOR LOW K - Abnormal; Notable for the following components:       Result Value    Sodium 133 (*)     Potassium reflex Magnesium 5.5 (*)     Chloride 98 (*)     AST 40 (*)     All other components within normal limits   POTASSIUM - Abnormal; Notable for the following components:    Potassium 5.4 (*)     All other components within normal limits   CBC WITH AUTO DIFFERENTIAL   POTASSIUM         All other labs were within normal range or not returned as of this dictation. EKG: All EKG's are interpreted by the Emergency Department Physician who either signs or Co-signs this chart in the absence of a cardiologist.  Please see their note for interpretation of EKG. RADIOLOGY:   Non plain film images ans such as CT, ultrasound, and MRI are read by the radiologist. Plain radiographic images are visualized and preliminarily interpreted by the ED Provider with the below findings:            Interpretation per the Radiologist below, if available at the time of this note:    No orders to display     No results found. No results found. No results found.       PROCEDURES   Unless otherwise noted below, none     Procedures     CRITICAL CARE TIME   Unless otherwise noted below, none          EMERGENCYDEPARTMENT COURSE/MDM:     Vitals:    Vitals:    03/13/23 1751 03/13/23 1802 03/13/23 1928 03/13/23 1929   BP: 116/72 120/76 (!) 113/58 (!) 113/58   Pulse:       Resp: 20      Temp:       TempSrc:       SpO2: 100%      Weight:       Height:             Patient was seen and evaluated by myself. Patient here for concerns for nausea and vomiting. Patient reports that he ate chicken around 530 and has been vomiting since. Patient has a history of a small esophagus. Patient reports that he follows with Dr. Rachel Farmer. Family reports that they called GI this morning and return to come to the ED. Patient has had his esophagus stretched approximately 4 years ago. On exam he is awake and alert hemodynamically stable nontoxic in appearance. He is actively vomiting at time of exam.  Patient did have lab values that were sent. He was given Protonix Zofran and glucagon. GI has been consulted. GI did take the patient to endoscopy for an EGD. Patient's lab values did show a hyperkalemia of 5.5 however it was hemolyzed. Wife reports that the patient actually just started on potassium replacements. Second potassium was obtained and was also elevated and hemolyzed. Patient was given IV fluids and a third potassium was ultimately obtained. Third potassium was found to be normal.  Patient was already out of the emergency department and endoscopy when labs resulted. Patient will be discharged home with instructions to follow-up with his primary care doctor in the next 2 days and return to the ED for worsening symptoms. He was also encouraged to follow-up with GI and given GI referral.  Patient was discharged with a PPI prescription.       Patient was given the following medications:  Medications   sterile water injection (has no administration in time range)   glucagon (rDNA) injection 1 mg (1 mg IntraVENous Given 3/13/23 1432)   pantoprazole (PROTONIX) injection 40 mg (40 mg IntraVENous Given 3/13/23 1429)   ondansetron (ZOFRAN) injection 4 mg (4 mg IntraVENous Given 3/13/23 1428)   0.9 % sodium chloride bolus (0 mLs IntraVENous Stopped 3/13/23 2036)            CONSULTS:  IP CONSULT TO GI      Discussion with other professionals - none    Social determinants - none    Records Reviewed - none    History from - patient    Limitations to history- none    Chronic conditions: has a past medical history of COPD (chronic obstructive pulmonary disease) ([address] Histoplasmosis, HLD (hyperlipidemia), Hypertension, and Sleep apnea. Is this patient to be included in the SEP-1 Core Measure due to severe sepsis or septic shock? No   Exclusion criteria - the patient is NOT to be included for SEP-1 Core Measure due to: Infection is not suspected         The patient tolerated their visit well. I have evaluated this patient. My supervising physician was available for consultation. The patient and / or the family were informed of the results of any tests, a time was given to answer questions, a plan was proposed and they agreed with plan. FINAL IMPRESSION      1.  Food impaction of esophagus, initial encounter          DISPOSITION/PLAN   DISPOSITION Decision To Discharge 03/13/2023 04:54:28 PM      PATIENT REFERRED TO:  Earl Shukla MD  [address]  [phone]    Schedule an appointment as soon as possible for a visit in 2 days  for re-evaluation    [facility] [place] [facility]  [address] [facility]  [phone]    for re-evaluation    Angelina Yun, [address] [facility], [address] [place] [address] [address] [facility]    Call   for re-evaluation    DISCHARGE MEDICATIONS:  New Prescriptions    OMEPRAZOLE (PRILOSEC) 20 MG DELAYED RELEASE CAPSULE    Take 1 capsule by mouth Daily       DISCONTINUED MEDICATIONS:  Discontinued Medications    OMEPRAZOLE (PRILOSEC) 20 MG DELAYED RELEASE CAPSULE    Take 1 pill in the morning 30 minutes before you take other medications or food to help with acid reflux              (Please note that portions of this note were completed with a voice recognition program.  Efforts were made to edit the dictations but occasionally words are mis-transcribed.)    Patient was seen during the time of the COVID pandemic. N95 and appropriate PPE was worn during the visit.       AKILA Kim CNP (electronically signed)        AKILA Kim CNP  03/13/23 2037       AKILA Kim CNP  03/16/23 8053

## 2023-03-13 NOTE — H&P
History and Physical / Pre-Sedation Assessment    Patient:  Armani Hughes   :   1955     Intended Procedure:  EGD    HPI: 78year old male with history of HTN, HLD, COPD, ELPIDIO, presented with food impaction    Past Medical History:   Diagnosis Date    COPD (chronic obstructive pulmonary disease) ([facility] [address]     Histoplasmosis         HLD (hyperlipidemia)     Hypertension     Sleep apnea      Past Surgical History:   Procedure Laterality Date    EYE MUSCLE SURGERY      as child    OTHER SURGICAL HISTORY      PE tubes bilat. UPPER GASTROINTESTINAL ENDOSCOPY      Approx 9 years ago    UPPER GASTROINTESTINAL ENDOSCOPY  2012    foreign body       Medications reviewed  Prior to Admission medications    Medication Sig Start Date End Date Taking? Authorizing Provider   losartan-hydroCHLOROthiazide (HYZAAR) 50-12.5 MG per tablet Take 1 tablet by mouth daily 2/10/23   Soha Shepherd MD   albuterol (PROVENTIL) (2.5 MG/3ML) 0.083% nebulizer solution USE THREE MILLILITERS VIA NEBULIZATION BY MOUTH EVERY 6 HOURS AS NEEDED FOR SHORTNESS OF BREATH 22   Tyson Luu MD   omeprazole (PRILOSEC) 20 MG delayed release capsule Take 1 pill in the morning 30 minutes before you take other medications or food to help with acid reflux 22   Soha Shepherd MD   sildenafil (VIAGRA) 50 MG tablet TAKE ONE TABLET BY MOUTH AS NEEDED FOR ERECTILE DYSFUNCTION 21   Soha Shepherd MD        Allergies: Allergies   Allergen Reactions    Adhesive Tape Other (See Comments)     blisters    Gabapentin     Incruse Ellipta [Umeclidinium Bromide]      Increase wheezing       Nurses notes reviewed and agreed.     Physical Exam:  Vital Signs: BP (!) 178/88   Pulse 81   Temp 98 °F (36.7 °C) (Temporal)   Resp 16   Ht 5' 10\" (1.778 m)   Wt 270 lb (122.5 kg)   SpO2 98%   BMI 38.74 kg/m²    Airway: Mallampati: II (soft palate, uvula, fauces visible)  Pulmonary:Normal  Cardiac:Normal  Abdomen:Normal    Pre-Procedure Assessment / Plan:  ASA: Class 3 - A patient with severe systemic disease that limits activity but is not incapacitating  Level of Sedation Plan: Moderate sedation  Post Procedure plan: Return to same level of care    I assessed the patient and find that the patient is in satisfactory condition to proceed with the planned procedure and sedation plan. I have explained the risk, benefits, and alternatives to the procedure; the patient understands and agrees to proceed.        Vijay Page MD  3/13/2023

## 2023-03-13 NOTE — ED NOTES
1411- Call placed to Dr. Nikkie Zhao returned the call and spoke to Richard Gates  03/13/23 1411       Daja Gates  03/13/23 1432

## 2023-03-16 ENCOUNTER — HOSPITAL ENCOUNTER (OUTPATIENT)
Dept: PULMONOLOGY | Age: 68
Discharge: HOME | End: 2023-03-16
Payer: MEDICARE

## 2023-03-16 VITALS — OXYGEN SATURATION: 96 %

## 2023-03-16 DIAGNOSIS — J44.9 CHRONIC OBSTRUCTIVE PULMONARY DISEASE, UNSPECIFIED COPD TYPE (HCC): ICD-10-CM

## 2023-03-16 DIAGNOSIS — J44.9 MODERATE COPD (CHRONIC OBSTRUCTIVE PULMONARY DISEASE) (HCC): ICD-10-CM

## 2023-03-16 PROCEDURE — 94060 EVALUATION OF WHEEZING: CPT

## 2023-03-16 PROCEDURE — 94760 N-INVAS EAR/PLS OXIMETRY 1: CPT

## 2023-03-16 PROCEDURE — 6370000000 HC RX 637 (ALT 250 FOR IP): Performed by: INTERNAL MEDICINE

## 2023-03-16 PROCEDURE — 94726 PLETHYSMOGRAPHY LUNG VOLUMES: CPT

## 2023-03-16 PROCEDURE — 94640 AIRWAY INHALATION TREATMENT: CPT

## 2023-03-16 PROCEDURE — 94729 DIFFUSING CAPACITY: CPT

## 2023-03-16 RX ORDER — ALBUTEROL SULFATE 90 UG/1
2 AEROSOL, METERED RESPIRATORY (INHALATION) ONCE
Status: COMPLETED | OUTPATIENT
Start: 2023-03-16 | End: 2023-03-16

## 2023-03-16 RX ADMIN — Medication 2 PUFF: at 11:48

## 2023-03-16 ASSESSMENT — ENCOUNTER SYMPTOMS
ABDOMINAL PAIN: 0
VOMITING: 1
SORE THROAT: 0
FACIAL SWELLING: 0
RHINORRHEA: 0
NAUSEA: 1
COLOR CHANGE: 0
SHORTNESS OF BREATH: 0

## 2023-03-17 LAB
DLCO %PRED: 74 %
DLCO PRED: NORMAL
DLCO/VA %PRED: NORMAL
DLCO/VA PRED: NORMAL
DLCO/VA: NORMAL
DLCO: NORMAL
EXPIRATORY TIME-POST: NORMAL
EXPIRATORY TIME: NORMAL
FEF 25-75% %CHNG: NORMAL
FEF 25-75% %PRED-POST: NORMAL
FEF 25-75% %PRED-PRE: NORMAL
FEF 25-75% PRED: NORMAL
FEF 25-75%-POST: NORMAL
FEF 25-75%-PRE: NORMAL
FEV1 %PRED-POST: 71 %
FEV1 %PRED-PRE: 63 %
FEV1 PRED: NORMAL
FEV1-POST: NORMAL
FEV1-PRE: NORMAL
FEV1/FVC %PRED-POST: NORMAL
FEV1/FVC %PRED-PRE: NORMAL
FEV1/FVC PRED: NORMAL
FEV1/FVC-POST: 70 %
FEV1/FVC-PRE: 70 %
FVC %PRED-POST: NORMAL
FVC %PRED-PRE: NORMAL
FVC PRED: NORMAL
FVC-POST: NORMAL
FVC-PRE: NORMAL
GAW %PRED: NORMAL
GAW PRED: NORMAL
GAW: NORMAL
IC %PRED: NORMAL
IC PRED: NORMAL
IC: NORMAL
MEP: NORMAL
MIP: NORMAL
MVV %PRED-PRE: NORMAL
MVV PRED: NORMAL
MVV-PRE: NORMAL
PEF %PRED-POST: NORMAL
PEF %PRED-PRE: NORMAL
PEF PRED: NORMAL
PEF%CHNG: NORMAL
PEF-POST: NORMAL
PEF-PRE: NORMAL
RAW %PRED: NORMAL
RAW PRED: NORMAL
RAW: NORMAL
RV %PRED: NORMAL
RV PRED: NORMAL
RV: NORMAL
SVC %PRED: NORMAL
SVC PRED: NORMAL
SVC: NORMAL
TLC %PRED: 70 %
TLC PRED: NORMAL
TLC: NORMAL
VA %PRED: NORMAL
VA PRED: NORMAL
VA: NORMAL
VTG %PRED: NORMAL
VTG PRED: NORMAL
VTG: NORMAL

## 2023-03-17 ASSESSMENT — PULMONARY FUNCTION TESTS
FEV1_PERCENT_PREDICTED_PRE: 63
FEV1/FVC_POST: 70
FEV1_PERCENT_PREDICTED_POST: 71
FEV1/FVC_PRE: 70

## 2023-03-17 NOTE — PROCEDURES
[address]                 [address] [place] [address]                               PULMONARY FUNCTION    PATIENT NAME: Dave Buckley                    :        1955  MED REC NO:   [id number]                          ROOM:  ACCOUNT NO:   [de-identified]                           ADMIT DATE: 2023  PROVIDER:     Jorge Alberto Gerardo MD    DATE OF PROCEDURE:  2023    INDICATION:  COPD. FINDINGS:  1. Spirometry: The FEV1 is 2.15 liters, which is 63% of predicted. The FEV1/FVC ratio is 0.7. Inhaled bronchodilators are given. There is  significant improvement in the FEV1 of 12%. 2.  Lung volumes: Total lung capacity is reduced at 4.97 liters or 70%  of predicted. 3.  Diffusion capacity:  DLCO is 22.23 mL/min/mmHg, which is 74% of  predicted. 4.  Flow volume loop does not show an obstructive pattern. IMPRESSION:  Air flow limitation is present, which is partially  reversible inhaled bronchodilators. However, the FEV1/FVC ratio was  normal.  There is a mild restrictive lung defect and a mild reduction in  diffusion capacity. Clinical correlation will be required.         Jeremías Cartwright MD    D: 2023 13:04:28       T: 2023 17:28:04     [id number]  Job#: [id number]     Doc#: [id number]    CC:

## 2023-03-28 ENCOUNTER — OFFICE VISIT (OUTPATIENT)
Dept: PULMONOLOGY | Age: 68
End: 2023-03-28
Payer: MEDICARE

## 2023-03-28 VITALS
BODY MASS INDEX: 38.65 KG/M2 | HEART RATE: 71 BPM | OXYGEN SATURATION: 96 % | SYSTOLIC BLOOD PRESSURE: 134 MMHG | DIASTOLIC BLOOD PRESSURE: 76 MMHG | RESPIRATION RATE: 18 BRPM | WEIGHT: 270 LBS | HEIGHT: 70 IN

## 2023-03-28 DIAGNOSIS — J45.909 UNCOMPLICATED ASTHMA, UNSPECIFIED ASTHMA SEVERITY, UNSPECIFIED WHETHER PERSISTENT: Primary | ICD-10-CM

## 2023-03-28 PROCEDURE — G8427 DOCREV CUR MEDS BY ELIG CLIN: HCPCS | Performed by: INTERNAL MEDICINE

## 2023-03-28 PROCEDURE — 3078F DIAST BP <80 MM HG: CPT | Performed by: INTERNAL MEDICINE

## 2023-03-28 PROCEDURE — 3075F SYST BP GE 130 - 139MM HG: CPT | Performed by: INTERNAL MEDICINE

## 2023-03-28 PROCEDURE — 1036F TOBACCO NON-USER: CPT | Performed by: INTERNAL MEDICINE

## 2023-03-28 PROCEDURE — 99214 OFFICE O/P EST MOD 30 MIN: CPT | Performed by: INTERNAL MEDICINE

## 2023-03-28 PROCEDURE — G8484 FLU IMMUNIZE NO ADMIN: HCPCS | Performed by: INTERNAL MEDICINE

## 2023-03-28 PROCEDURE — G8417 CALC BMI ABV UP PARAM F/U: HCPCS | Performed by: INTERNAL MEDICINE

## 2023-03-28 PROCEDURE — 3017F COLORECTAL CA SCREEN DOC REV: CPT | Performed by: INTERNAL MEDICINE

## 2023-03-28 PROCEDURE — 1123F ACP DISCUSS/DSCN MKR DOCD: CPT | Performed by: INTERNAL MEDICINE

## 2023-03-28 RX ORDER — MONTELUKAST SODIUM 10 MG/1
10 TABLET ORAL DAILY
Qty: 30 TABLET | Refills: 3 | Status: SHIPPED | OUTPATIENT
Start: 2023-03-28

## 2023-03-28 NOTE — PROGRESS NOTES
P  [facility]                                               Pulmonary Clinic Consult     I had the pleasure of seeing  Rod Sidhu     Chief Complaint   Patient presents with    COPD    Results     PFT        HISTORY OF PRESENT ILLNESS:    Rod Sidhu is a 78y.o. year old  Who start smoking at age 14  And increase gradually 1 pack and has about 36 PPY      Quit 2001   The Patient comes in with SOB that has been going on the last 10 years Associated with cough and has some wheezing.,    He/She  states that it get worse with exercise or walking long distance and he can walk . 1 block  And go 1-2 flight of stairs before get short winded    He is on diet for weight loss  On albuterol ,was incruse and had reaction and stooped     He use nebulizer asneed  Us enebs as needed 1-2 a week     Today visit  Has carolyn doing better  States SOB has been great  Just use nebulizer 3-4 a week   No CP  No cough   Wheeze some times         ALLERGIES:    Allergies   Allergen Reactions    Adhesive Tape Other (See Comments)     blisters    Gabapentin     Incruse Ellipta [Umeclidinium Bromide]      Increase wheezing    Tiotropium Rash       PAST MEDICAL HISTORY:       Diagnosis Date    COPD (chronic obstructive pulmonary disease) (HCC)     Histoplasmosis     2000    HLD (hyperlipidemia)     Hypertension     Sleep apnea        MEDICATIONS:   Current Outpatient Medications   Medication Sig Dispense Refill    omeprazole (PRILOSEC) 20 MG delayed release capsule Take 1 capsule by mouth Daily 180 capsule 1    losartan-hydroCHLOROthiazide (HYZAAR) 50-12.5 MG per tablet Take 1 tablet by mouth daily 30 tablet 3    albuterol (PROVENTIL) (2.5 MG/3ML) 0.083% nebulizer solution USE THREE MILLILITERS VIA NEBULIZATION BY MOUTH EVERY 6 HOURS AS NEEDED FOR SHORTNESS OF BREATH 375 mL 5    sildenafil (VIAGRA) 50 MG tablet TAKE ONE TABLET BY MOUTH AS NEEDED FOR ERECTILE DYSFUNCTION 9 tablet 2     No current

## 2023-08-07 DIAGNOSIS — J44.9 MODERATE COPD (CHRONIC OBSTRUCTIVE PULMONARY DISEASE) (HCC): ICD-10-CM

## 2023-08-10 RX ORDER — ALBUTEROL SULFATE 2.5 MG/3ML
SOLUTION RESPIRATORY (INHALATION)
Qty: 375 ML | Refills: 5 | Status: SHIPPED | OUTPATIENT
Start: 2023-08-10

## 2024-03-20 NOTE — PROGRESS NOTES
capsule by mouth Daily 180 capsule 1    losartan-hydroCHLOROthiazide (HYZAAR) 50-12.5 MG per tablet Take 1 tablet by mouth daily 30 tablet 3    sildenafil (VIAGRA) 50 MG tablet TAKE ONE TABLET BY MOUTH AS NEEDED FOR ERECTILE DYSFUNCTION 9 tablet 2     No current facility-administered medications on file prior to visit.      Social History     Tobacco Use    Smoking status: Former     Current packs/day: 0.00     Average packs/day: 1.5 packs/day for 20.0 years (30.0 ttl pk-yrs)     Types: Cigarettes     Start date:      Quit date:      Years since quittin.2    Smokeless tobacco: Never   Substance Use Topics    Alcohol use: No     Alcohol/week: 0.0 standard drinks of alcohol        CARE TEAM  Patient Care Team:  Pura Ceballos MD as PCP - General (Family Medicine)  Pura Ceballos MD as PCP - Empaneled Provider  Bo Cedillo MD as Consulting Physician (Pulmonology)  Savannah Blum APRN - CNP as Nurse Practitioner (Nurse Practitioner)  Tamie Caba APRN - CNP as Nurse Practitioner (Family Nurse Practitioner)  Tyson Oconnor MD as Consulting Physician (Pulmonology)    Electronically signed by AKILA Childress CNP on 3/21/2024 at 1:57 PM     This dictation was generated by voice recognition computer software.  Although all attempts are made to edit the dictation for accuracy, there may be errors in the transcription that are not intended.

## 2024-03-21 ENCOUNTER — OFFICE VISIT (OUTPATIENT)
Dept: PRIMARY CARE CLINIC | Age: 69
End: 2024-03-21
Payer: MEDICARE

## 2024-03-21 VITALS
WEIGHT: 290 LBS | RESPIRATION RATE: 16 BRPM | BODY MASS INDEX: 41.61 KG/M2 | HEART RATE: 76 BPM | TEMPERATURE: 97.7 F | SYSTOLIC BLOOD PRESSURE: 142 MMHG | OXYGEN SATURATION: 98 % | DIASTOLIC BLOOD PRESSURE: 88 MMHG

## 2024-03-21 DIAGNOSIS — E87.5 HYPERKALEMIA: ICD-10-CM

## 2024-03-21 DIAGNOSIS — B35.9 TINEA: Primary | ICD-10-CM

## 2024-03-21 LAB
ANION GAP SERPL CALCULATED.3IONS-SCNC: 10 MMOL/L (ref 3–16)
BUN SERPL-MCNC: 12 MG/DL (ref 7–20)
CALCIUM SERPL-MCNC: 9.5 MG/DL (ref 8.3–10.6)
CHLORIDE SERPL-SCNC: 101 MMOL/L (ref 99–110)
CO2 SERPL-SCNC: 25 MMOL/L (ref 21–32)
CREAT SERPL-MCNC: 0.9 MG/DL (ref 0.8–1.3)
GFR SERPLBLD CREATININE-BSD FMLA CKD-EPI: >60 ML/MIN/{1.73_M2}
GLUCOSE SERPL-MCNC: 91 MG/DL (ref 70–99)
POTASSIUM SERPL-SCNC: 4.3 MMOL/L (ref 3.5–5.1)
SODIUM SERPL-SCNC: 136 MMOL/L (ref 136–145)

## 2024-03-21 PROCEDURE — G8427 DOCREV CUR MEDS BY ELIG CLIN: HCPCS | Performed by: NURSE PRACTITIONER

## 2024-03-21 PROCEDURE — 3077F SYST BP >= 140 MM HG: CPT | Performed by: NURSE PRACTITIONER

## 2024-03-21 PROCEDURE — 1123F ACP DISCUSS/DSCN MKR DOCD: CPT | Performed by: NURSE PRACTITIONER

## 2024-03-21 PROCEDURE — 99213 OFFICE O/P EST LOW 20 MIN: CPT | Performed by: NURSE PRACTITIONER

## 2024-03-21 PROCEDURE — 3017F COLORECTAL CA SCREEN DOC REV: CPT | Performed by: NURSE PRACTITIONER

## 2024-03-21 PROCEDURE — G8484 FLU IMMUNIZE NO ADMIN: HCPCS | Performed by: NURSE PRACTITIONER

## 2024-03-21 PROCEDURE — 3079F DIAST BP 80-89 MM HG: CPT | Performed by: NURSE PRACTITIONER

## 2024-03-21 PROCEDURE — G8417 CALC BMI ABV UP PARAM F/U: HCPCS | Performed by: NURSE PRACTITIONER

## 2024-03-21 PROCEDURE — 1036F TOBACCO NON-USER: CPT | Performed by: NURSE PRACTITIONER

## 2024-03-21 RX ORDER — KETOCONAZOLE 20 MG/ML
SHAMPOO TOPICAL
Qty: 120 ML | Refills: 0 | Status: SHIPPED | OUTPATIENT
Start: 2024-03-21

## 2024-03-21 RX ORDER — KETOCONAZOLE 20 MG/G
CREAM TOPICAL
Qty: 60 G | Refills: 0 | Status: SHIPPED | OUTPATIENT
Start: 2024-03-21

## 2024-03-21 SDOH — ECONOMIC STABILITY: FOOD INSECURITY: WITHIN THE PAST 12 MONTHS, YOU WORRIED THAT YOUR FOOD WOULD RUN OUT BEFORE YOU GOT MONEY TO BUY MORE.: NEVER TRUE

## 2024-03-21 SDOH — ECONOMIC STABILITY: INCOME INSECURITY: HOW HARD IS IT FOR YOU TO PAY FOR THE VERY BASICS LIKE FOOD, HOUSING, MEDICAL CARE, AND HEATING?: NOT HARD AT ALL

## 2024-03-21 SDOH — ECONOMIC STABILITY: FOOD INSECURITY: WITHIN THE PAST 12 MONTHS, THE FOOD YOU BOUGHT JUST DIDN'T LAST AND YOU DIDN'T HAVE MONEY TO GET MORE.: NEVER TRUE

## 2024-03-21 ASSESSMENT — PATIENT HEALTH QUESTIONNAIRE - PHQ9
1. LITTLE INTEREST OR PLEASURE IN DOING THINGS: NOT AT ALL
5. POOR APPETITE OR OVEREATING: NOT AT ALL
SUM OF ALL RESPONSES TO PHQ QUESTIONS 1-9: 0
3. TROUBLE FALLING OR STAYING ASLEEP: NOT AT ALL
7. TROUBLE CONCENTRATING ON THINGS, SUCH AS READING THE NEWSPAPER OR WATCHING TELEVISION: NOT AT ALL
10. IF YOU CHECKED OFF ANY PROBLEMS, HOW DIFFICULT HAVE THESE PROBLEMS MADE IT FOR YOU TO DO YOUR WORK, TAKE CARE OF THINGS AT HOME, OR GET ALONG WITH OTHER PEOPLE: NOT DIFFICULT AT ALL
4. FEELING TIRED OR HAVING LITTLE ENERGY: NOT AT ALL
SUM OF ALL RESPONSES TO PHQ9 QUESTIONS 1 & 2: 0
SUM OF ALL RESPONSES TO PHQ QUESTIONS 1-9: 0
6. FEELING BAD ABOUT YOURSELF - OR THAT YOU ARE A FAILURE OR HAVE LET YOURSELF OR YOUR FAMILY DOWN: NOT AT ALL
9. THOUGHTS THAT YOU WOULD BE BETTER OFF DEAD, OR OF HURTING YOURSELF: NOT AT ALL
2. FEELING DOWN, DEPRESSED OR HOPELESS: NOT AT ALL
SUM OF ALL RESPONSES TO PHQ QUESTIONS 1-9: 0
8. MOVING OR SPEAKING SO SLOWLY THAT OTHER PEOPLE COULD HAVE NOTICED. OR THE OPPOSITE, BEING SO FIGETY OR RESTLESS THAT YOU HAVE BEEN MOVING AROUND A LOT MORE THAN USUAL: NOT AT ALL
SUM OF ALL RESPONSES TO PHQ QUESTIONS 1-9: 0

## 2024-03-21 ASSESSMENT — ENCOUNTER SYMPTOMS
DIARRHEA: 0
SHORTNESS OF BREATH: 0
NAUSEA: 0
VOMITING: 0
COUGH: 0
WHEEZING: 0

## 2024-03-28 ENCOUNTER — TELEPHONE (OUTPATIENT)
Dept: PULMONOLOGY | Age: 69
End: 2024-03-28

## 2024-03-28 ENCOUNTER — TELEPHONE (OUTPATIENT)
Dept: PRIMARY CARE CLINIC | Age: 69
End: 2024-03-28

## 2024-03-28 ENCOUNTER — OFFICE VISIT (OUTPATIENT)
Dept: PULMONOLOGY | Age: 69
End: 2024-03-28
Payer: MEDICARE

## 2024-03-28 VITALS
OXYGEN SATURATION: 98 % | HEART RATE: 73 BPM | HEIGHT: 70 IN | BODY MASS INDEX: 42.12 KG/M2 | WEIGHT: 294.2 LBS | SYSTOLIC BLOOD PRESSURE: 130 MMHG | DIASTOLIC BLOOD PRESSURE: 84 MMHG

## 2024-03-28 DIAGNOSIS — J44.9 MODERATE COPD (CHRONIC OBSTRUCTIVE PULMONARY DISEASE) (HCC): Primary | ICD-10-CM

## 2024-03-28 DIAGNOSIS — J44.9 CHRONIC OBSTRUCTIVE PULMONARY DISEASE, UNSPECIFIED COPD TYPE (HCC): Primary | ICD-10-CM

## 2024-03-28 PROCEDURE — 1123F ACP DISCUSS/DSCN MKR DOCD: CPT | Performed by: INTERNAL MEDICINE

## 2024-03-28 PROCEDURE — 99214 OFFICE O/P EST MOD 30 MIN: CPT | Performed by: INTERNAL MEDICINE

## 2024-03-28 PROCEDURE — G8417 CALC BMI ABV UP PARAM F/U: HCPCS | Performed by: INTERNAL MEDICINE

## 2024-03-28 PROCEDURE — 3017F COLORECTAL CA SCREEN DOC REV: CPT | Performed by: INTERNAL MEDICINE

## 2024-03-28 PROCEDURE — G8427 DOCREV CUR MEDS BY ELIG CLIN: HCPCS | Performed by: INTERNAL MEDICINE

## 2024-03-28 PROCEDURE — 3023F SPIROM DOC REV: CPT | Performed by: INTERNAL MEDICINE

## 2024-03-28 PROCEDURE — 3075F SYST BP GE 130 - 139MM HG: CPT | Performed by: INTERNAL MEDICINE

## 2024-03-28 PROCEDURE — 3079F DIAST BP 80-89 MM HG: CPT | Performed by: INTERNAL MEDICINE

## 2024-03-28 PROCEDURE — G8484 FLU IMMUNIZE NO ADMIN: HCPCS | Performed by: INTERNAL MEDICINE

## 2024-03-28 PROCEDURE — 1036F TOBACCO NON-USER: CPT | Performed by: INTERNAL MEDICINE

## 2024-03-28 RX ORDER — ALBUTEROL SULFATE 90 UG/1
2 AEROSOL, METERED RESPIRATORY (INHALATION) EVERY 4 HOURS PRN
Qty: 18 G | Refills: 6 | Status: SHIPPED | OUTPATIENT
Start: 2024-03-28

## 2024-03-28 NOTE — TELEPHONE ENCOUNTER
It appears that the patient uses [facility].    Faxed nasal mask order, nasal pillows mask order, full face mask order, and ov notes to [facility] at [phone] via [employer]ax.

## 2024-03-28 NOTE — PROGRESS NOTES
capsule Take 1 capsule by mouth Daily 180 capsule 1    losartan-hydroCHLOROthiazide (HYZAAR) 50-12.5 MG per tablet Take 1 tablet by mouth daily 30 tablet 3    sildenafil (VIAGRA) 50 MG tablet TAKE ONE TABLET BY MOUTH AS NEEDED FOR ERECTILE DYSFUNCTION 9 tablet 2     No current facility-administered medications for this visit.       SOCIAL AND OCCUPATIONAL HEALTH:  Social History     Tobacco Use   Smoking Status Former    Current packs/day: 0.00    Average packs/day: 1.5 packs/day for 20.0 years (30.0 ttl pk-yrs)    Types: Cigarettes    Start date:     Quit date:     Years since quittin.2   Smokeless Tobacco Never     TB :no   Pets no  Industrial exposure:mild  Birds :no     SURGICAL HISTORY:   Past Surgical History:   Procedure Laterality Date    EYE MUSCLE SURGERY      as child    OTHER SURGICAL HISTORY      PE tubes bilat.    UPPER GASTROINTESTINAL ENDOSCOPY      Approx 9 years ago    UPPER GASTROINTESTINAL ENDOSCOPY  2012    foreign body    UPPER GASTROINTESTINAL ENDOSCOPY N/A 3/13/2023    EGD FOREIGN BODY REMOVAL performed by Malik Manley MD at [facility] ENDOSCOPY       FAMILY HISTORY:   Lung cancer:no  DVT or PE no    REVIEW OF SYSTEMS:  Constitutional: General health is good . There has been no weight changes. No fevers, fatigue or weakness.   Head: Patient denies any history of trauma, convulsive disorder or syncope.    Skin:  Patient denies history of changes in pigmentation, eruptions or pruritus.  No easy bruising or bleeding.  EENT: no nasal congestion   Cardiovascular ,No chest pain+   ,No edema ,  Respiration:SOB: + ,DIAL :  Gastrointestinal:No GI bleed ,no melena  ,no hematemesis    Musculoskeletal: no joint pain ,no swelling  Neurological:no , syncope.  Denies twitching, convulsions, loss of consciousness or memory.     Endocrine:  . No history of goiter, exophthalmos or dryness of skin.  The patient has no history of diabetes.    Hematopoietic:  No history of bleeding

## 2024-04-08 ENCOUNTER — PATIENT MESSAGE (OUTPATIENT)
Dept: PULMONOLOGY | Age: 69
End: 2024-04-08

## 2024-04-08 NOTE — TELEPHONE ENCOUNTER
From: Andrew Mcadams  To: Dr. Tyson Oconnor  Sent: 4/8/2024 11:22 AM EDT  Subject: Cough won't go away    I have been taking medicine, but cough is still the same and breathing gets rough sometimes. Could i have some kind of infection in my lungs? Do I need to get a Chest X-Ray?    Thanks.

## 2024-04-09 ENCOUNTER — TELEPHONE (OUTPATIENT)
Dept: PULMONOLOGY | Age: 69
End: 2024-04-09

## 2024-04-09 ENCOUNTER — HOSPITAL ENCOUNTER (OUTPATIENT)
Dept: CT IMAGING | Age: 69
Discharge: HOME | End: 2024-04-09
Payer: MEDICARE

## 2024-04-09 DIAGNOSIS — J44.9 MODERATE COPD (CHRONIC OBSTRUCTIVE PULMONARY DISEASE) (HCC): Primary | ICD-10-CM

## 2024-04-09 DIAGNOSIS — J44.9 MODERATE COPD (CHRONIC OBSTRUCTIVE PULMONARY DISEASE) (HCC): ICD-10-CM

## 2024-04-09 PROCEDURE — 71250 CT THORAX DX C-: CPT

## 2024-04-09 NOTE — TELEPHONE ENCOUNTER
Patient Called and was at [facility] and said that they were there for a CT and that they needed to order to be sent.

## 2024-04-16 DIAGNOSIS — R91.8 LUNG NODULES: Primary | ICD-10-CM

## 2024-04-24 ENCOUNTER — PATIENT MESSAGE (OUTPATIENT)
Dept: PRIMARY CARE CLINIC | Age: 69
End: 2024-04-24

## 2024-04-24 DIAGNOSIS — B35.9 TINEA: ICD-10-CM

## 2024-04-24 RX ORDER — KETOCONAZOLE 20 MG/ML
SHAMPOO TOPICAL
Qty: 120 ML | Refills: 0 | Status: SHIPPED | OUTPATIENT
Start: 2024-04-24

## 2024-04-24 RX ORDER — KETOCONAZOLE 20 MG/G
CREAM TOPICAL
Qty: 60 G | Refills: 0 | Status: SHIPPED | OUTPATIENT
Start: 2024-04-24

## 2024-04-24 NOTE — TELEPHONE ENCOUNTER
From: Andrew Mcadams  To: Venessa Gaviria  Sent: 4/24/2024 6:19 AM EDT  Subject: Can I Have a refill?    Ringworm has not completely cleared. I am out of shampoo and creme. Could I have a refill of both? Thank You

## 2024-04-24 NOTE — TELEPHONE ENCOUNTER
Medication:   Requested Prescriptions     Pending Prescriptions Disp Refills    ketoconazole (NIZORAL) 2 % cream 60 g 0     Sig: Apply topically daily.    ketoconazole (NIZORAL) 2 % shampoo 120 mL 0     Sig: Apply topically daily as needed.     Last filled: 3/21/24  Last appt: 3/21/2024   Next appt: Visit date not found    Last OARRS:       1/2/2019     2:42 PM   RX Monitoring   Attestation The Prescription Monitoring Report for this patient was reviewed today.

## 2024-05-27 SDOH — HEALTH STABILITY: PHYSICAL HEALTH: ON AVERAGE, HOW MANY MINUTES DO YOU ENGAGE IN EXERCISE AT THIS LEVEL?: 60 MIN

## 2024-05-27 SDOH — HEALTH STABILITY: PHYSICAL HEALTH: ON AVERAGE, HOW MANY DAYS PER WEEK DO YOU ENGAGE IN MODERATE TO STRENUOUS EXERCISE (LIKE A BRISK WALK)?: 4 DAYS

## 2024-05-27 ASSESSMENT — PATIENT HEALTH QUESTIONNAIRE - PHQ9
1. LITTLE INTEREST OR PLEASURE IN DOING THINGS: NOT AT ALL
2. FEELING DOWN, DEPRESSED OR HOPELESS: NOT AT ALL
SUM OF ALL RESPONSES TO PHQ QUESTIONS 1-9: 0
SUM OF ALL RESPONSES TO PHQ9 QUESTIONS 1 & 2: 0

## 2024-05-27 ASSESSMENT — LIFESTYLE VARIABLES
HOW OFTEN DO YOU HAVE A DRINK CONTAINING ALCOHOL: NEVER
HOW MANY STANDARD DRINKS CONTAINING ALCOHOL DO YOU HAVE ON A TYPICAL DAY: 0
HOW MANY STANDARD DRINKS CONTAINING ALCOHOL DO YOU HAVE ON A TYPICAL DAY: PATIENT DOES NOT DRINK
HOW OFTEN DO YOU HAVE A DRINK CONTAINING ALCOHOL: 1
HOW OFTEN DO YOU HAVE SIX OR MORE DRINKS ON ONE OCCASION: 1

## 2024-06-03 ENCOUNTER — OFFICE VISIT (OUTPATIENT)
Dept: PRIMARY CARE CLINIC | Age: 69
End: 2024-06-03
Payer: MEDICARE

## 2024-06-03 VITALS
SYSTOLIC BLOOD PRESSURE: 150 MMHG | WEIGHT: 286.2 LBS | DIASTOLIC BLOOD PRESSURE: 84 MMHG | OXYGEN SATURATION: 98 % | TEMPERATURE: 97.9 F | BODY MASS INDEX: 40.97 KG/M2 | HEART RATE: 64 BPM | HEIGHT: 70 IN

## 2024-06-03 DIAGNOSIS — E78.2 MIXED HYPERLIPIDEMIA: ICD-10-CM

## 2024-06-03 DIAGNOSIS — J44.9 CHRONIC OBSTRUCTIVE PULMONARY DISEASE, UNSPECIFIED COPD TYPE (HCC): ICD-10-CM

## 2024-06-03 DIAGNOSIS — Z00.00 ENCOUNTER FOR SUBSEQUENT ANNUAL WELLNESS VISIT (AWV) IN MEDICARE PATIENT: Primary | ICD-10-CM

## 2024-06-03 DIAGNOSIS — I10 PRIMARY HYPERTENSION: ICD-10-CM

## 2024-06-03 DIAGNOSIS — R21 RASH: ICD-10-CM

## 2024-06-03 DIAGNOSIS — Z97.4 HEARING AID WORN: ICD-10-CM

## 2024-06-03 DIAGNOSIS — N40.0 BENIGN PROSTATIC HYPERPLASIA WITHOUT LOWER URINARY TRACT SYMPTOMS: ICD-10-CM

## 2024-06-03 DIAGNOSIS — K21.9 GASTROESOPHAGEAL REFLUX DISEASE WITHOUT ESOPHAGITIS: ICD-10-CM

## 2024-06-03 DIAGNOSIS — N52.9 ERECTILE DYSFUNCTION, UNSPECIFIED ERECTILE DYSFUNCTION TYPE: ICD-10-CM

## 2024-06-03 LAB
ALBUMIN SERPL-MCNC: 3.9 G/DL (ref 3.4–5)
ALBUMIN/GLOB SERPL: 1.2 {RATIO} (ref 1.1–2.2)
ALP SERPL-CCNC: 90 U/L (ref 40–129)
ALT SERPL-CCNC: 16 U/L (ref 10–40)
ANION GAP SERPL CALCULATED.3IONS-SCNC: 10 MMOL/L (ref 3–16)
AST SERPL-CCNC: 21 U/L (ref 15–37)
BASOPHILS # BLD: 0.1 K/UL (ref 0–0.2)
BASOPHILS NFR BLD: 1.5 %
BILIRUB SERPL-MCNC: 0.3 MG/DL (ref 0–1)
BUN SERPL-MCNC: 11 MG/DL (ref 7–20)
CALCIUM SERPL-MCNC: 9.2 MG/DL (ref 8.3–10.6)
CHLORIDE SERPL-SCNC: 102 MMOL/L (ref 99–110)
CHOLEST SERPL-MCNC: 201 MG/DL (ref 0–199)
CO2 SERPL-SCNC: 25 MMOL/L (ref 21–32)
CREAT SERPL-MCNC: 0.9 MG/DL (ref 0.8–1.3)
DEPRECATED RDW RBC AUTO: 14.4 % (ref 12.4–15.4)
EOSINOPHIL # BLD: 0.3 K/UL (ref 0–0.6)
EOSINOPHIL NFR BLD: 5.9 %
GFR SERPLBLD CREATININE-BSD FMLA CKD-EPI: >90 ML/MIN/{1.73_M2}
GLUCOSE SERPL-MCNC: 88 MG/DL (ref 70–99)
HCT VFR BLD AUTO: 43.8 % (ref 40.5–52.5)
HDLC SERPL-MCNC: 46 MG/DL (ref 40–60)
HGB BLD-MCNC: 14.8 G/DL (ref 13.5–17.5)
LDLC SERPL CALC-MCNC: 134 MG/DL
LYMPHOCYTES # BLD: 1.1 K/UL (ref 1–5.1)
LYMPHOCYTES NFR BLD: 19.3 %
MCH RBC QN AUTO: 29.5 PG (ref 26–34)
MCHC RBC AUTO-ENTMCNC: 33.8 G/DL (ref 31–36)
MCV RBC AUTO: 87.4 FL (ref 80–100)
MONOCYTES # BLD: 0.4 K/UL (ref 0–1.3)
MONOCYTES NFR BLD: 8 %
NEUTROPHILS # BLD: 3.6 K/UL (ref 1.7–7.7)
NEUTROPHILS NFR BLD: 65.3 %
PLATELET # BLD AUTO: 148 K/UL (ref 135–450)
PMV BLD AUTO: 8.9 FL (ref 5–10.5)
POTASSIUM SERPL-SCNC: 4.4 MMOL/L (ref 3.5–5.1)
PROT SERPL-MCNC: 7.1 G/DL (ref 6.4–8.2)
PSA SERPL DL<=0.01 NG/ML-MCNC: 0.46 NG/ML (ref 0–4)
RBC # BLD AUTO: 5.01 M/UL (ref 4.2–5.9)
SODIUM SERPL-SCNC: 137 MMOL/L (ref 136–145)
TRIGL SERPL-MCNC: 105 MG/DL (ref 0–150)
TSH SERPL DL<=0.005 MIU/L-ACNC: 1.21 UIU/ML (ref 0.27–4.2)
VLDLC SERPL CALC-MCNC: 21 MG/DL
WBC # BLD AUTO: 5.6 K/UL (ref 4–11)

## 2024-06-03 PROCEDURE — G0439 PPPS, SUBSEQ VISIT: HCPCS | Performed by: FAMILY MEDICINE

## 2024-06-03 PROCEDURE — 1123F ACP DISCUSS/DSCN MKR DOCD: CPT | Performed by: FAMILY MEDICINE

## 2024-06-03 PROCEDURE — 3079F DIAST BP 80-89 MM HG: CPT | Performed by: FAMILY MEDICINE

## 2024-06-03 PROCEDURE — 3017F COLORECTAL CA SCREEN DOC REV: CPT | Performed by: FAMILY MEDICINE

## 2024-06-03 PROCEDURE — 3077F SYST BP >= 140 MM HG: CPT | Performed by: FAMILY MEDICINE

## 2024-06-03 RX ORDER — CHLORTHALIDONE 25 MG/1
25 TABLET ORAL DAILY PRN
Qty: 90 TABLET | Refills: 0 | Status: SHIPPED | OUTPATIENT
Start: 2024-06-03

## 2024-06-03 RX ORDER — CLOTRIMAZOLE AND BETAMETHASONE DIPROPIONATE 10; .64 MG/G; MG/G
CREAM TOPICAL
Qty: 45 G | Refills: 1 | Status: SHIPPED | OUTPATIENT
Start: 2024-06-03

## 2024-06-03 RX ORDER — VALSARTAN AND HYDROCHLOROTHIAZIDE 160; 25 MG/1; MG/1
1 TABLET ORAL DAILY
Qty: 90 TABLET | Refills: 1 | Status: SHIPPED | OUTPATIENT
Start: 2024-06-03 | End: 2025-06-03

## 2024-06-03 RX ORDER — SILDENAFIL 50 MG/1
50 TABLET, FILM COATED ORAL PRN
Qty: 9 TABLET | Refills: 5 | Status: SHIPPED | OUTPATIENT
Start: 2024-06-03

## 2024-06-03 ASSESSMENT — ENCOUNTER SYMPTOMS
APNEA: 1
ABDOMINAL PAIN: 0
ROS SKIN COMMENTS: NO DERMATOLOGY
EYES NEGATIVE: 1

## 2024-06-03 NOTE — PROGRESS NOTES
Insufficiency exacerbated by high BMI  Pt didn't use compression stockings Hx ECHO & Venous doppler last VL Venous in record 2/21/2023 @[facility]  Dx Bilateral carotic stenosis   Dx Mixed Hyperlipidemia  Pt didn't take Rx Lipitor 40 mg   Dermatology referral  Get MA visit 1 month  Get visit 6 month

## 2024-06-05 DIAGNOSIS — E78.9 LIPID DISORDER: Primary | ICD-10-CM

## 2024-06-05 RX ORDER — ROSUVASTATIN CALCIUM 10 MG/1
10 TABLET, COATED ORAL NIGHTLY
Qty: 90 TABLET | Refills: 3 | Status: SHIPPED | OUTPATIENT
Start: 2024-06-05

## 2024-07-22 DIAGNOSIS — R21 RASH: ICD-10-CM

## 2024-07-22 RX ORDER — CLOTRIMAZOLE AND BETAMETHASONE DIPROPIONATE 10; .64 MG/G; MG/G
CREAM TOPICAL
Qty: 45 G | Refills: 0 | Status: SHIPPED | OUTPATIENT
Start: 2024-07-22

## 2024-07-22 NOTE — TELEPHONE ENCOUNTER
Medication:   Requested Prescriptions     Pending Prescriptions Disp Refills    clotrimazole-betamethasone (LOTRISONE) 1-0.05 % cream 45 g 1     Sig: Apply topically 2 times daily.     Last Filled:  6.3.24    Last appt: 6/3/2024   Next appt: Visit date not found    Last OARRS:       1/2/2019     2:42 PM   RX Monitoring   Attestation The Prescription Monitoring Report for this patient was reviewed today.

## 2024-08-22 ENCOUNTER — APPOINTMENT (OUTPATIENT)
Dept: GENERAL RADIOLOGY | Age: 69
End: 2024-08-22
Payer: MEDICARE

## 2024-08-22 ENCOUNTER — APPOINTMENT (OUTPATIENT)
Dept: CT IMAGING | Age: 69
End: 2024-08-22
Payer: MEDICARE

## 2024-08-22 ENCOUNTER — HOSPITAL ENCOUNTER (OUTPATIENT)
Age: 69
Setting detail: OBSERVATION
Discharge: HOME | End: 2024-08-24
Attending: EMERGENCY MEDICINE | Admitting: STUDENT IN AN ORGANIZED HEALTH CARE EDUCATION/TRAINING PROGRAM
Payer: MEDICARE

## 2024-08-22 DIAGNOSIS — I42.9 CARDIOMYOPATHY, UNSPECIFIED TYPE (HCC): ICD-10-CM

## 2024-08-22 DIAGNOSIS — R07.9 CHEST PAIN, UNSPECIFIED TYPE: Primary | ICD-10-CM

## 2024-08-22 DIAGNOSIS — I20.9 ANGINA PECTORIS (HCC): ICD-10-CM

## 2024-08-22 LAB
ALBUMIN SERPL-MCNC: 3.6 G/DL (ref 3.4–5)
ALBUMIN/GLOB SERPL: 1.2 {RATIO} (ref 1.1–2.2)
ALP SERPL-CCNC: 87 U/L (ref 40–129)
ALT SERPL-CCNC: 16 U/L (ref 10–40)
ANION GAP SERPL CALCULATED.3IONS-SCNC: 10 MMOL/L (ref 3–16)
AST SERPL-CCNC: 21 U/L (ref 15–37)
BASE EXCESS BLDV CALC-SCNC: 1.3 MMOL/L (ref -3–3)
BASOPHILS # BLD: 0.1 K/UL (ref 0–0.2)
BASOPHILS NFR BLD: 1.2 %
BILIRUB SERPL-MCNC: 0.3 MG/DL (ref 0–1)
BUN SERPL-MCNC: 12 MG/DL (ref 7–20)
CALCIUM SERPL-MCNC: 9 MG/DL (ref 8.3–10.6)
CHLORIDE SERPL-SCNC: 99 MMOL/L (ref 99–110)
CO2 BLDV-SCNC: 27 MMOL/L
CO2 SERPL-SCNC: 26 MMOL/L (ref 21–32)
COHGB MFR BLDV: 1.2 % (ref 0–1.5)
CREAT SERPL-MCNC: 0.9 MG/DL (ref 0.8–1.3)
DEPRECATED RDW RBC AUTO: 14.7 % (ref 12.4–15.4)
EKG ATRIAL RATE: 78 BPM
EKG DIAGNOSIS: NORMAL
EKG P AXIS: 18 DEGREES
EKG P-R INTERVAL: 116 MS
EKG Q-T INTERVAL: 388 MS
EKG QRS DURATION: 134 MS
EKG QTC CALCULATION (BAZETT): 442 MS
EKG R AXIS: -41 DEGREES
EKG T AXIS: 35 DEGREES
EKG VENTRICULAR RATE: 78 BPM
EOSINOPHIL # BLD: 0.3 K/UL (ref 0–0.6)
EOSINOPHIL NFR BLD: 5.3 %
GFR SERPLBLD CREATININE-BSD FMLA CKD-EPI: >90 ML/MIN/{1.73_M2}
GLUCOSE SERPL-MCNC: 128 MG/DL (ref 70–99)
HCO3 BLDV-SCNC: 25.9 MMOL/L (ref 23–29)
HCT VFR BLD AUTO: 42.8 % (ref 40.5–52.5)
HGB BLD-MCNC: 14.8 G/DL (ref 13.5–17.5)
LIPASE SERPL-CCNC: 25 U/L (ref 13–60)
LYMPHOCYTES # BLD: 1.1 K/UL (ref 1–5.1)
LYMPHOCYTES NFR BLD: 17.9 %
MCH RBC QN AUTO: 30.4 PG (ref 26–34)
MCHC RBC AUTO-ENTMCNC: 34.6 G/DL (ref 31–36)
MCV RBC AUTO: 87.9 FL (ref 80–100)
METHGB MFR BLDV: 0.1 %
MONOCYTES # BLD: 0.5 K/UL (ref 0–1.3)
MONOCYTES NFR BLD: 8.6 %
NEUTROPHILS # BLD: 4.1 K/UL (ref 1.7–7.7)
NEUTROPHILS NFR BLD: 67 %
NT-PROBNP SERPL-MCNC: 71 PG/ML (ref 0–124)
O2 CT VFR BLDV CALC: 22 VOL %
O2 THERAPY: ABNORMAL
PCO2 BLDV: 40.7 MMHG (ref 40–50)
PH BLDV: 7.42 [PH] (ref 7.35–7.45)
PLATELET # BLD AUTO: 164 K/UL (ref 135–450)
PMV BLD AUTO: 8.5 FL (ref 5–10.5)
PO2 BLDV: 125.4 MMHG (ref 25–40)
POTASSIUM SERPL-SCNC: 4 MMOL/L (ref 3.5–5.1)
PROT SERPL-MCNC: 6.7 G/DL (ref 6.4–8.2)
RBC # BLD AUTO: 4.87 M/UL (ref 4.2–5.9)
SAO2 % BLDV: 99 %
SODIUM SERPL-SCNC: 135 MMOL/L (ref 136–145)
TROPONIN, HIGH SENSITIVITY: 11 NG/L (ref 0–22)
TROPONIN, HIGH SENSITIVITY: 12 NG/L (ref 0–22)
TROPONIN, HIGH SENSITIVITY: 12 NG/L (ref 0–22)
WBC # BLD AUTO: 6 K/UL (ref 4–11)

## 2024-08-22 PROCEDURE — 6370000000 HC RX 637 (ALT 250 FOR IP): Performed by: EMERGENCY MEDICINE

## 2024-08-22 PROCEDURE — 82803 BLOOD GASES ANY COMBINATION: CPT

## 2024-08-22 PROCEDURE — 6360000002 HC RX W HCPCS: Performed by: STUDENT IN AN ORGANIZED HEALTH CARE EDUCATION/TRAINING PROGRAM

## 2024-08-22 PROCEDURE — 6360000004 HC RX CONTRAST MEDICATION: Performed by: EMERGENCY MEDICINE

## 2024-08-22 PROCEDURE — 96372 THER/PROPH/DIAG INJ SC/IM: CPT

## 2024-08-22 PROCEDURE — 93010 ELECTROCARDIOGRAM REPORT: CPT | Performed by: INTERNAL MEDICINE

## 2024-08-22 PROCEDURE — 85025 COMPLETE CBC W/AUTO DIFF WBC: CPT

## 2024-08-22 PROCEDURE — 6370000000 HC RX 637 (ALT 250 FOR IP): Performed by: STUDENT IN AN ORGANIZED HEALTH CARE EDUCATION/TRAINING PROGRAM

## 2024-08-22 PROCEDURE — 84484 ASSAY OF TROPONIN QUANT: CPT

## 2024-08-22 PROCEDURE — 93005 ELECTROCARDIOGRAM TRACING: CPT | Performed by: STUDENT IN AN ORGANIZED HEALTH CARE EDUCATION/TRAINING PROGRAM

## 2024-08-22 PROCEDURE — G0378 HOSPITAL OBSERVATION PER HR: HCPCS

## 2024-08-22 PROCEDURE — 80053 COMPREHEN METABOLIC PANEL: CPT

## 2024-08-22 PROCEDURE — 70450 CT HEAD/BRAIN W/O DYE: CPT

## 2024-08-22 PROCEDURE — 83880 ASSAY OF NATRIURETIC PEPTIDE: CPT

## 2024-08-22 PROCEDURE — 99285 EMERGENCY DEPT VISIT HI MDM: CPT

## 2024-08-22 PROCEDURE — 83690 ASSAY OF LIPASE: CPT

## 2024-08-22 PROCEDURE — 74174 CTA ABD&PLVS W/CONTRAST: CPT

## 2024-08-22 PROCEDURE — 71045 X-RAY EXAM CHEST 1 VIEW: CPT

## 2024-08-22 RX ORDER — VALSARTAN 80 MG/1
160 TABLET ORAL DAILY
Status: DISCONTINUED | OUTPATIENT
Start: 2024-08-22 | End: 2024-08-24 | Stop reason: HOSPADM

## 2024-08-22 RX ORDER — PANTOPRAZOLE SODIUM 40 MG/1
40 TABLET, DELAYED RELEASE ORAL
Status: DISCONTINUED | OUTPATIENT
Start: 2024-08-23 | End: 2024-08-24 | Stop reason: HOSPADM

## 2024-08-22 RX ORDER — HYDRALAZINE HYDROCHLORIDE 20 MG/ML
10 INJECTION INTRAMUSCULAR; INTRAVENOUS EVERY 6 HOURS PRN
Status: DISCONTINUED | OUTPATIENT
Start: 2024-08-22 | End: 2024-08-23

## 2024-08-22 RX ORDER — MAGNESIUM SULFATE IN WATER 40 MG/ML
2000 INJECTION, SOLUTION INTRAVENOUS PRN
Status: DISCONTINUED | OUTPATIENT
Start: 2024-08-22 | End: 2024-08-24 | Stop reason: HOSPADM

## 2024-08-22 RX ORDER — SODIUM CHLORIDE 9 MG/ML
INJECTION, SOLUTION INTRAVENOUS PRN
Status: DISCONTINUED | OUTPATIENT
Start: 2024-08-22 | End: 2024-08-24 | Stop reason: HOSPADM

## 2024-08-22 RX ORDER — SODIUM CHLORIDE 0.9 % (FLUSH) 0.9 %
5-40 SYRINGE (ML) INJECTION PRN
Status: DISCONTINUED | OUTPATIENT
Start: 2024-08-22 | End: 2024-08-24 | Stop reason: HOSPADM

## 2024-08-22 RX ORDER — ASPIRIN 81 MG/1
81 TABLET, CHEWABLE ORAL DAILY
Status: DISCONTINUED | OUTPATIENT
Start: 2024-08-23 | End: 2024-08-24 | Stop reason: HOSPADM

## 2024-08-22 RX ORDER — POTASSIUM CHLORIDE 1500 MG/1
40 TABLET, EXTENDED RELEASE ORAL PRN
Status: DISCONTINUED | OUTPATIENT
Start: 2024-08-22 | End: 2024-08-24 | Stop reason: HOSPADM

## 2024-08-22 RX ORDER — IOPAMIDOL 755 MG/ML
85 INJECTION, SOLUTION INTRAVASCULAR
Status: COMPLETED | OUTPATIENT
Start: 2024-08-22 | End: 2024-08-22

## 2024-08-22 RX ORDER — HYDROCHLOROTHIAZIDE 25 MG/1
25 TABLET ORAL DAILY
Status: DISCONTINUED | OUTPATIENT
Start: 2024-08-23 | End: 2024-08-24 | Stop reason: HOSPADM

## 2024-08-22 RX ORDER — ACETAMINOPHEN 650 MG/1
650 SUPPOSITORY RECTAL EVERY 6 HOURS PRN
Status: DISCONTINUED | OUTPATIENT
Start: 2024-08-22 | End: 2024-08-24 | Stop reason: HOSPADM

## 2024-08-22 RX ORDER — ROSUVASTATIN CALCIUM 10 MG/1
40 TABLET, COATED ORAL NIGHTLY
Status: DISCONTINUED | OUTPATIENT
Start: 2024-08-22 | End: 2024-08-24 | Stop reason: HOSPADM

## 2024-08-22 RX ORDER — POTASSIUM CHLORIDE 7.45 MG/ML
10 INJECTION INTRAVENOUS PRN
Status: DISCONTINUED | OUTPATIENT
Start: 2024-08-22 | End: 2024-08-24 | Stop reason: HOSPADM

## 2024-08-22 RX ORDER — ATORVASTATIN CALCIUM 40 MG/1
40 TABLET, FILM COATED ORAL NIGHTLY
Status: DISCONTINUED | OUTPATIENT
Start: 2024-08-22 | End: 2024-08-23

## 2024-08-22 RX ORDER — ONDANSETRON 2 MG/ML
4 INJECTION INTRAMUSCULAR; INTRAVENOUS EVERY 6 HOURS PRN
Status: DISCONTINUED | OUTPATIENT
Start: 2024-08-22 | End: 2024-08-24 | Stop reason: HOSPADM

## 2024-08-22 RX ORDER — ENOXAPARIN SODIUM 100 MG/ML
30 INJECTION SUBCUTANEOUS 2 TIMES DAILY
Status: DISCONTINUED | OUTPATIENT
Start: 2024-08-22 | End: 2024-08-24 | Stop reason: HOSPADM

## 2024-08-22 RX ORDER — POLYETHYLENE GLYCOL 3350 17 G/17G
17 POWDER, FOR SOLUTION ORAL DAILY PRN
Status: DISCONTINUED | OUTPATIENT
Start: 2024-08-22 | End: 2024-08-24 | Stop reason: HOSPADM

## 2024-08-22 RX ORDER — ONDANSETRON 4 MG/1
4 TABLET, ORALLY DISINTEGRATING ORAL EVERY 8 HOURS PRN
Status: DISCONTINUED | OUTPATIENT
Start: 2024-08-22 | End: 2024-08-24 | Stop reason: HOSPADM

## 2024-08-22 RX ORDER — SODIUM CHLORIDE 0.9 % (FLUSH) 0.9 %
5-40 SYRINGE (ML) INJECTION EVERY 12 HOURS SCHEDULED
Status: DISCONTINUED | OUTPATIENT
Start: 2024-08-22 | End: 2024-08-24 | Stop reason: HOSPADM

## 2024-08-22 RX ORDER — ACETAMINOPHEN 325 MG/1
650 TABLET ORAL EVERY 6 HOURS PRN
Status: DISCONTINUED | OUTPATIENT
Start: 2024-08-22 | End: 2024-08-24 | Stop reason: HOSPADM

## 2024-08-22 RX ADMIN — IOPAMIDOL 85 ML: 755 INJECTION, SOLUTION INTRAVENOUS at 17:02

## 2024-08-22 RX ADMIN — ROSUVASTATIN CALCIUM 40 MG: 10 TABLET, FILM COATED ORAL at 22:28

## 2024-08-22 RX ADMIN — ENOXAPARIN SODIUM 30 MG: 100 INJECTION SUBCUTANEOUS at 21:37

## 2024-08-22 RX ADMIN — ATORVASTATIN CALCIUM 40 MG: 40 TABLET, FILM COATED ORAL at 21:37

## 2024-08-22 RX ADMIN — VALSARTAN 160 MG: 80 TABLET, FILM COATED ORAL at 18:49

## 2024-08-22 ASSESSMENT — PAIN - FUNCTIONAL ASSESSMENT: PAIN_FUNCTIONAL_ASSESSMENT: NONE - DENIES PAIN

## 2024-08-22 NOTE — ED NOTES
Pt oxygen saturation dropping into the 80's while sleeping. Supplemental oxygen applied via NC @ 2L.

## 2024-08-22 NOTE — ED NOTES
Received pt awake lying on bed, on cardiac monitor, verbalized that he's not having chest at the moment, S. Troponin collected  and sent, serial ECG done.
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
## 2024-08-22 NOTE — ED PROVIDER NOTES
Emergency Department Provider Note  Location: [facility] ED  8/22/2024     Patient Identification  Andrew Mcadams is a 68 y.o. male    Chief Complaint  Shortness of Breath (Pt complains of chest pain and shortness of breath.  Felt dizzy and some numbness in right arm.  Vomiting and some diarrhea.  Started  an hour ago.)          HPI  (History provided by patient)  Patient is a 68-year-old male with a history of asthma, COPD, non-smoker, ELPIDIO, hypertension hyperlipidemia, who presents with chest pain and paresthesias in the upper extremities started around 3 PM today.  Patient was sitting not doing anything exertional when he developed a pain in the center of his chest.  Did not radiate did not go to the back or shoulders or neck.  Associated with nausea.  Reports paresthesias in both upper extremities but mainly the right upper extremity described as pins-and-needles.  No loss of sensation no weakness no headache or double vision or difficulty speaking.  Patient took 325 aspirin at home and symptoms resolved prior to arrival.      Nursing Notes were all reviewed and agreed with, or any disagreements were addressed in the HPI:  Allergies:   Allergies   Allergen Reactions    Adhesive Tape Other (See Comments)     blisters    Gabapentin     Incruse Ellipta [Umeclidinium Bromide]      Increase wheezing    Tiotropium Rash       Past medical history:  has a past medical history of Asthma (09/2023), COPD (chronic obstructive pulmonary disease) ([facility]), Hearing loss (2/2016), Histoplasmosis, HLD (hyperlipidemia), Hypertension, Irritable bowel syndrome, Osteoarthritis (3/2018), and Sleep apnea.    Past surgical history:  has a past surgical history that includes Upper gastrointestinal endoscopy; other surgical history; Upper gastrointestinal endoscopy (04/29/2012); eye muscle surgery; and Upper gastrointestinal endoscopy (N/A, 3/13/2023).    Home medications:   Prior to Admission medications    Medication Sig  paresthesias, r/o aortic disease Additional Contrast?->1 Reason for Exam: arm paresthesias FINDINGS: Nonvascular Mediastinum: There is no mediastinal or hilar lymphadenopathy.  The thoracic aorta is not aneurysmal.  No dissection is seen.  There are calcified hilar lymph nodes.  There are no defects involving the major pulmonary arteries. Lungs/pleura: The lung parenchyma demonstrates the pendant areas of atelectasis.  No airspace consolidations are seen.  No pleural effusions or pneumothoraces are noted. Organs: The liver, spleen, gallbladder, pancreas and adrenal glands appear unremarkable.  There is symmetric enhancement of the kidneys.  No hydronephrosis is seen.  There are calcified granulomas. GI/Bowel: Evaluation of the bowel is limited as no enteric contrast was given.  No dilated loops of bowel are seen. I do not see a dilated appendix. Pelvis: No pelvic masses or fluid collections are seen. Peritoneum/Retroperitoneum: The abdominal aorta is not aneurysmal. There are shotty mesenteric and retroperitoneal lymph nodes but no retroperitoneal or mesenteric lymphadenopathy is seen. Bones/Soft Tissues: No acute bony abnormalities are noted. There are shotty inguinal lymph nodes noted.There are old healed bilateral rib fractures. There is a fat containing umbilical hernia. VASCULAR The thoracic aorta is not aneurysmal.  No aortic dissection is seen.  The origins of the great vessels appear unremarkable.  The left vertebral artery appears to arise directly from the aortic arch. There is atherosclerotic disease involving the abdominal aorta.  No abdominal aortic stenosis or aneurysm is seen.  No dissection is seen.  There are single renal arteries bilaterally with no renal artery stenosis.  There is extensive calcium at the origin of the superior mesenteric artery.  No obvious stenosis is seen involving the celiac, superior mesenteric or inferior mesenteric arteries.  There is diffuse atherosclerotic disease     08/22/24 1907 08/22/24 1913  acetaminophen (TYLENOL) suppository 650 mg  EVERY 6 HOURS PRN        Placed in \"Or\" Linked Group    Acknowledged SAPNA ERICKSON N    08/22/24 1907 08/22/24 1913  polyethylene glycol (GLYCOLAX) packet 17 g  DAILY PRN         Acknowledged SAPNA ERICKSON N    08/22/24 1907 08/22/24 1913  sodium chloride flush 0.9 % injection 5-40 mL  PRN         Acknowledged SAPNA ERICKSON N    08/22/24 1900 08/22/24 1845  valsartan (DIOVAN) tablet 160 mg  DAILY         Last MAR action: Given - by MEHDI MENDOSA on 08/22/24 at 1849 CHANTELL GALEANO    08/22/24 1701 08/22/24 1702  iopamidol (ISOVUE-370) 76 % injection 85 mL  IMG ONCE PRN         Last MAR action: Given - by BEENA VALENTINE on 08/22/24 at 1702 CHANTELL GALEANO              - I discussed the results with patient/family including incidental findings.  - At this point I do not see indication for further work-up in the ER, as it is unlikely  and poses more risk than benefit.  - Follow up plan and return precautions also discussed.  Patient/family verbalized understanding of plan and agreeable to plan.        Clinical Impression:  1. Chest pain, unspecified type    2. Cardiomyopathy, unspecified type (HCC)          Disposition:  Admit to telemetry in stable condition.    Blood pressure (!) 191/81, pulse 66, temperature 97.7 °F (36.5 °C), temperature source Oral, resp. rate 18, weight 131.5 kg (290 lb), SpO2 96%.    Patient was given scripts for the following medications. I counseled patient how to take these medications.   New Prescriptions    No medications on file     Modified Medications    No medications on file       Disposition referral (if applicable):  No follow-up provider specified.    Chantell AGUILAR, am the primary attending of record and contributed the majority of evaluation and treatment of emergent care for this encounter.     Total critical care time is 0 minutes, which excludes separately billable

## 2024-08-23 ENCOUNTER — APPOINTMENT (OUTPATIENT)
Age: 69
End: 2024-08-23
Attending: STUDENT IN AN ORGANIZED HEALTH CARE EDUCATION/TRAINING PROGRAM
Payer: MEDICARE

## 2024-08-23 ENCOUNTER — APPOINTMENT (OUTPATIENT)
Dept: NUCLEAR MEDICINE | Age: 69
End: 2024-08-23
Payer: MEDICARE

## 2024-08-23 ENCOUNTER — HOSPITAL ENCOUNTER (OUTPATIENT)
Age: 69
Setting detail: OBSERVATION
End: 2024-08-23
Payer: MEDICARE

## 2024-08-23 LAB
ANION GAP SERPL CALCULATED.3IONS-SCNC: 9 MMOL/L (ref 3–16)
BUN SERPL-MCNC: 15 MG/DL (ref 7–20)
CALCIUM SERPL-MCNC: 8.9 MG/DL (ref 8.3–10.6)
CHLORIDE SERPL-SCNC: 100 MMOL/L (ref 99–110)
CHOLEST SERPL-MCNC: 202 MG/DL (ref 0–199)
CO2 SERPL-SCNC: 25 MMOL/L (ref 21–32)
CREAT SERPL-MCNC: 1 MG/DL (ref 0.8–1.3)
DEPRECATED RDW RBC AUTO: 14.8 % (ref 12.4–15.4)
ECHO AO ROOT DIAM: 3.3 CM
ECHO AO ROOT INDEX: 1.35 CM/M2
ECHO AV CUSP MM: 2.3 CM
ECHO AV PEAK GRADIENT: 6 MMHG
ECHO AV PEAK VELOCITY: 1.2 M/S
ECHO BSA: 2.55 M2
ECHO LA AREA 2C: 12.5 CM2
ECHO LA AREA 4C: 23.4 CM2
ECHO LA DIAMETER INDEX: 1.6 CM/M2
ECHO LA DIAMETER: 3.9 CM
ECHO LA MAJOR AXIS: 5.4 CM
ECHO LA MINOR AXIS: 4.4 CM
ECHO LA TO AORTIC ROOT RATIO: 1.18
ECHO LA VOL BP: 54 ML (ref 18–58)
ECHO LA VOL MOD A2C: 29 ML (ref 18–58)
ECHO LA VOL MOD A4C: 83 ML (ref 18–58)
ECHO LA VOL/BSA BIPLANE: 22 ML/M2 (ref 16–34)
ECHO LA VOLUME INDEX MOD A2C: 12 ML/M2 (ref 16–34)
ECHO LA VOLUME INDEX MOD A4C: 34 ML/M2 (ref 16–34)
ECHO LV E' LATERAL VELOCITY: 11 CM/S
ECHO LV E' SEPTAL VELOCITY: 8 CM/S
ECHO LV EF PHYSICIAN: 55 %
ECHO LV FRACTIONAL SHORTENING: 40 % (ref 28–44)
ECHO LV INTERNAL DIMENSION DIASTOLE INDEX: 1.97 CM/M2
ECHO LV INTERNAL DIMENSION DIASTOLIC: 4.8 CM (ref 4.2–5.9)
ECHO LV INTERNAL DIMENSION SYSTOLIC INDEX: 1.19 CM/M2
ECHO LV INTERNAL DIMENSION SYSTOLIC: 2.9 CM
ECHO LV ISOVOLUMETRIC RELAXATION TIME (IVRT): 119 MS
ECHO LV IVSD: 1.2 CM (ref 0.6–1)
ECHO LV MASS 2D: 232.2 G (ref 88–224)
ECHO LV MASS INDEX 2D: 95.2 G/M2 (ref 49–115)
ECHO LV POSTERIOR WALL DIASTOLIC: 1.3 CM (ref 0.6–1)
ECHO LV RELATIVE WALL THICKNESS RATIO: 0.54
ECHO MV A VELOCITY: 0.77 M/S
ECHO MV E VELOCITY: 0.62 M/S
ECHO MV E/A RATIO: 0.81
ECHO MV E/E' LATERAL: 5.64
ECHO MV E/E' RATIO (AVERAGED): 6.69
ECHO MV E/E' SEPTAL: 7.75
ECHO PV MAX VELOCITY: 1.2 M/S
ECHO PV PEAK GRADIENT: 6 MMHG
ECHO RA AREA 4C: 17.1 CM2
ECHO RA END SYSTOLIC VOLUME APICAL 4 CHAMBER INDEX BSA: 18 ML/M2
ECHO RA VOLUME: 45 ML
ECHO RV BASAL DIMENSION: 4.2 CM
ECHO RV FREE WALL PEAK S': 18 CM/S
ECHO RV LONGITUDINAL DIMENSION: 8.2 CM
ECHO RV MID DIMENSION: 3.5 CM
ECHO RV TAPSE: 2.7 CM (ref 1.7–?)
ECHO TV PEAK GRADIENT: 2 MMHG
EKG ATRIAL RATE: 68 BPM
EKG DIAGNOSIS: NORMAL
EKG P AXIS: 51 DEGREES
EKG P-R INTERVAL: 152 MS
EKG Q-T INTERVAL: 418 MS
EKG QRS DURATION: 134 MS
EKG QTC CALCULATION (BAZETT): 444 MS
EKG R AXIS: -51 DEGREES
EKG T AXIS: 33 DEGREES
EKG VENTRICULAR RATE: 68 BPM
GFR SERPLBLD CREATININE-BSD FMLA CKD-EPI: 82 ML/MIN/{1.73_M2}
GLUCOSE SERPL-MCNC: 98 MG/DL (ref 70–99)
HCT VFR BLD AUTO: 42.7 % (ref 40.5–52.5)
HDLC SERPL-MCNC: 43 MG/DL (ref 40–60)
HGB BLD-MCNC: 14.2 G/DL (ref 13.5–17.5)
LDLC SERPL CALC-MCNC: 134 MG/DL
MCH RBC QN AUTO: 30.1 PG (ref 26–34)
MCHC RBC AUTO-ENTMCNC: 33.3 G/DL (ref 31–36)
MCV RBC AUTO: 90.2 FL (ref 80–100)
PLATELET # BLD AUTO: 151 K/UL (ref 135–450)
PMV BLD AUTO: 8.3 FL (ref 5–10.5)
POTASSIUM SERPL-SCNC: 4.6 MMOL/L (ref 3.5–5.1)
RBC # BLD AUTO: 4.73 M/UL (ref 4.2–5.9)
SODIUM SERPL-SCNC: 134 MMOL/L (ref 136–145)
TRIGL SERPL-MCNC: 127 MG/DL (ref 0–150)
TROPONIN, HIGH SENSITIVITY: 12 NG/L (ref 0–22)
TSH SERPL DL<=0.005 MIU/L-ACNC: 0.87 UIU/ML (ref 0.27–4.2)
VLDLC SERPL CALC-MCNC: 25 MG/DL
WBC # BLD AUTO: 6.5 K/UL (ref 4–11)

## 2024-08-23 PROCEDURE — 99223 1ST HOSP IP/OBS HIGH 75: CPT | Performed by: INTERNAL MEDICINE

## 2024-08-23 PROCEDURE — A9502 TC99M TETROFOSMIN: HCPCS | Performed by: INTERNAL MEDICINE

## 2024-08-23 PROCEDURE — 3430000000 HC RX DIAGNOSTIC RADIOPHARMACEUTICAL: Performed by: INTERNAL MEDICINE

## 2024-08-23 PROCEDURE — G0378 HOSPITAL OBSERVATION PER HR: HCPCS

## 2024-08-23 PROCEDURE — 94761 N-INVAS EAR/PLS OXIMETRY MLT: CPT

## 2024-08-23 PROCEDURE — 84443 ASSAY THYROID STIM HORMONE: CPT

## 2024-08-23 PROCEDURE — 84484 ASSAY OF TROPONIN QUANT: CPT

## 2024-08-23 PROCEDURE — 94660 CPAP INITIATION&MGMT: CPT

## 2024-08-23 PROCEDURE — 96372 THER/PROPH/DIAG INJ SC/IM: CPT

## 2024-08-23 PROCEDURE — 93306 TTE W/DOPPLER COMPLETE: CPT

## 2024-08-23 PROCEDURE — 2580000003 HC RX 258: Performed by: STUDENT IN AN ORGANIZED HEALTH CARE EDUCATION/TRAINING PROGRAM

## 2024-08-23 PROCEDURE — 80061 LIPID PANEL: CPT

## 2024-08-23 PROCEDURE — 93010 ELECTROCARDIOGRAM REPORT: CPT | Performed by: STUDENT IN AN ORGANIZED HEALTH CARE EDUCATION/TRAINING PROGRAM

## 2024-08-23 PROCEDURE — 6360000002 HC RX W HCPCS: Performed by: INTERNAL MEDICINE

## 2024-08-23 PROCEDURE — 93306 TTE W/DOPPLER COMPLETE: CPT | Performed by: INTERNAL MEDICINE

## 2024-08-23 PROCEDURE — 78452 HT MUSCLE IMAGE SPECT MULT: CPT

## 2024-08-23 PROCEDURE — 85027 COMPLETE CBC AUTOMATED: CPT

## 2024-08-23 PROCEDURE — 6360000002 HC RX W HCPCS: Performed by: STUDENT IN AN ORGANIZED HEALTH CARE EDUCATION/TRAINING PROGRAM

## 2024-08-23 PROCEDURE — 93017 CV STRESS TEST TRACING ONLY: CPT

## 2024-08-23 PROCEDURE — 99232 SBSQ HOSP IP/OBS MODERATE 35: CPT | Performed by: INTERNAL MEDICINE

## 2024-08-23 PROCEDURE — 6370000000 HC RX 637 (ALT 250 FOR IP): Performed by: EMERGENCY MEDICINE

## 2024-08-23 PROCEDURE — 80048 BASIC METABOLIC PNL TOTAL CA: CPT

## 2024-08-23 PROCEDURE — 2700000000 HC OXYGEN THERAPY PER DAY

## 2024-08-23 PROCEDURE — 36415 COLL VENOUS BLD VENIPUNCTURE: CPT

## 2024-08-23 PROCEDURE — 6370000000 HC RX 637 (ALT 250 FOR IP): Performed by: STUDENT IN AN ORGANIZED HEALTH CARE EDUCATION/TRAINING PROGRAM

## 2024-08-23 RX ORDER — REGADENOSON 0.08 MG/ML
0.4 INJECTION, SOLUTION INTRAVENOUS
Status: CANCELLED | OUTPATIENT
Start: 2024-08-23

## 2024-08-23 RX ORDER — HYDRALAZINE HYDROCHLORIDE 20 MG/ML
5 INJECTION INTRAMUSCULAR; INTRAVENOUS EVERY 6 HOURS PRN
Status: DISCONTINUED | OUTPATIENT
Start: 2024-08-23 | End: 2024-08-24 | Stop reason: HOSPADM

## 2024-08-23 RX ORDER — REGADENOSON 0.08 MG/ML
0.4 INJECTION, SOLUTION INTRAVENOUS
Status: COMPLETED | OUTPATIENT
Start: 2024-08-23 | End: 2024-08-23

## 2024-08-23 RX ADMIN — ENOXAPARIN SODIUM 30 MG: 100 INJECTION SUBCUTANEOUS at 19:58

## 2024-08-23 RX ADMIN — VALSARTAN 160 MG: 80 TABLET, FILM COATED ORAL at 07:58

## 2024-08-23 RX ADMIN — TETROFOSMIN 32.5 MILLICURIE: 1.38 INJECTION, POWDER, LYOPHILIZED, FOR SOLUTION INTRAVENOUS at 12:45

## 2024-08-23 RX ADMIN — Medication 10 ML: at 19:58

## 2024-08-23 RX ADMIN — ROSUVASTATIN CALCIUM 40 MG: 10 TABLET, FILM COATED ORAL at 19:58

## 2024-08-23 RX ADMIN — REGADENOSON 0.4 MG: 0.08 INJECTION, SOLUTION INTRAVENOUS at 12:45

## 2024-08-23 RX ADMIN — Medication 10 ML: at 07:59

## 2024-08-23 RX ADMIN — HYDROCHLOROTHIAZIDE 25 MG: 25 TABLET ORAL at 07:58

## 2024-08-23 RX ADMIN — ENOXAPARIN SODIUM 30 MG: 100 INJECTION SUBCUTANEOUS at 07:58

## 2024-08-23 RX ADMIN — ASPIRIN 81 MG: 81 TABLET, CHEWABLE ORAL at 07:58

## 2024-08-23 NOTE — NURSING NOTE
Last Appointment   10/5/2023  Next Appointment  7/11/2024   Pt educated on cardiac stress testing. Pt verbalizes understanding to cardiac stress testing. Questions and concerns addressed. Pt is agreeable to proceed with stress testing.

## 2024-08-23 NOTE — PROGRESS NOTES
08/23/24 0111   NIV Type   $NIV $Daily Charge   NIV Started/Stopped (S)  On   Equipment Type v60   Mode CPAP   Mask Type Full face mask   Mask Size Medium   Assessment   Pulse 71   SpO2 97 %   Settings/Measurements   CPAP/EPAP 17 cmH2O   Vt (Measured) 665 mL   FiO2  40 %   Minute Volume (L/min) 10 Liters   Mask Leak (lpm) 20 lpm   Patient's Home Machine No   Alarm Settings   Alarms On Y   Low Pressure (cmH2O) 5 cmH2O   High Pressure (cmH2O) 35 cmH2O   Delay Alarm 20 sec(s)   RR Low (bpm) 12   RR High (bpm) 40 br/min

## 2024-08-23 NOTE — PROGRESS NOTES
Received report from Camille NAVA. Placed on tele, verified with [facility]. Dr angela at bedside. Transport order placed for patient and to bring up equipment

## 2024-08-23 NOTE — PROGRESS NOTES
Back from stress, notified cmu. Plan 2-day stress test. NPO at mn. Currently waiting stress results before eating

## 2024-08-23 NOTE — PROGRESS NOTES
Patient admitted to room 235 from ED 16. Side rails up x2. Patient does have an order for telemetry. Bed is locked and in lowest position. Call light placed in patient reach. Patient explained the routine of the hospital, including but not limited to lab work, vital signs, hourly rounding, etc. Care plans and education updated, CHG wipes performed at time of admission.     BP (!) 165/105   Pulse 76   Temp 97.6 °F (36.4 °C) (Oral)   Resp 18   Ht 1.778 m (5' 10\")   Wt 131.5 kg (290 lb)   SpO2 94%   BMI 41.61 kg/m²     Most recent set of vitals as shown.     Patient has an LDA that does not require CHG wipes, including possible a surgery incision, carrillo catheter, or a central line.     4 Eyes Skin Assessment     The patient is being assess for   Admission    I agree that 2 RN's have performed a thorough Head to Toe Skin Assessment on the patient. ALL assessment sites listed below have been assessed.      Areas assessed for pressure by both nurses:   [x]   Head, Face, and Ears   [x]   Shoulders, Back, and Chest, Abdomen  [x]   Arms, Elbows, and Hands   []   Coccyx, Sacrum, and Ischium  [x]   Legs, Feet, and Heels    Denies skin issues. States he will notify staff if any issues arise. Elongated toenails. Some edema to BLE            **SHARE this note so that the co-signing nurse is able to place an eSignature**    Co-signer eSignature: Electronically signed by Janice Mcfadden RN on 8/23/24 at 4:14 PM EDT    Does the Patient have Skin Breakdown related to pressure?  No              Remberto Prevention initiated:  No   Wound Care Orders initiated:  No      [facility] nurse consulted for Pressure Injury (Stage 3,4, Unstageable, DTI, NWPT, Complex wounds)and New or Established Ostomies:  No      Primary Nurse eSignature: Electronically signed by Carlos Barriga RN on 8/23/24 at 12:38 PM EDT      Patient is able to demonstrate the ability to move from a reclining position to an upright position within the recliner.

## 2024-08-23 NOTE — NURSING NOTE
A Lexiscan stress test was completed on this patient as ordered. The patient tolerated the procedure well.  Awaiting stress imaging at this time.  Patient is a 2 day procedure and resting images will be completed tomorrow 8/24. Bedside RN aware.

## 2024-08-23 NOTE — FLOWSHEET NOTE
08/23/24 0745   Vital Signs   Temp 97.6 °F (36.4 °C)   Temp Source Oral   Pulse 72   Heart Rate Source Monitor   Respirations 18   BP (!) 154/77   MAP (Calculated) 103   BP Location Left upper arm   BP Method Automatic   Patient Position Semi fowlers   Pain Assessment   Pain Assessment None - Denies Pain   Oxygen Therapy   SpO2 96 %   Pulse Oximetry Type Continuous   O2 Device None (Room air)   Patient Observation   Patient Observations Awake in bed   Rhythm Interpretation   Cardiac Rhythm Sinus rhythm     Echo completed.   Awaiting cardiology consult.  Awake alert and oriented. Denies pain. Denies Symptoms that made him come to the ED (numbness in right arm and pain in chest. N/v and dizziness).

## 2024-08-23 NOTE — PROGRESS NOTES
Progress Note    Admit Date:  8/22/2024    Patient was admitted for chest pain and uncontrolled HTN.     Subjective:  Mr. Mcadams seen laying in bed.   Not having chest pain now.     Objective:   Patient Vitals for the past 4 hrs:   BP Temp Temp src Resp SpO2 Height Weight   08/23/24 0727 (!) 182/74 -- -- -- -- 1.778 m (5' 10\") 131.5 kg (290 lb)   08/23/24 0430 (!) 169/80 97.8 °F (36.6 °C) Oral 18 97 % -- --        No intake or output data in the 24 hours ending 08/23/24 0743    Physical Exam:    Gen: No distress. Alert.   Eyes: PERRL. No sclera icterus. No conjunctival injection.   ENT: No discharge. Pharynx clear.   Neck: No JVD.  Trachea midline. +carotid bruit  Resp: No accessory muscle use. No crackles. No wheezes. No rhonchi.   CV: Regular rate. Regular rhythm. No murmur.  No rub. +non-pitting BLE edema.   GI: Non-tender. Non-distended.  Normal bowel sounds.  Skin: Warm and dry. No nodule on exposed extremities. No rash on exposed extremities.   M/S: No cyanosis. No joint deformity. No clubbing.   Neuro: Awake. Grossly nonfocal    Psych: Oriented x 3. No anxiety or agitation.       I ASHWINI PARKINSON MD have reviewed the chart on Andrew Mcadams and personally interviewed and examined patient, reviewed the data (labs and imaging) and after discussion with my PA formulated the plan.  Agree with note with the following edits.        I reviewed the patient's Past Medical History, Past Surgical History, Medications, and Allergies.     Physical exam:    BP (!) 165/105   Pulse 76   Temp 97.6 °F (36.4 °C) (Oral)   Resp 18   Ht 1.778 m (5' 10\")   Wt 131.5 kg (290 lb)   SpO2 94%   BMI 41.61 kg/m²     Gen: No distress. Alert.   Eyes: PERRL. No sclera icterus. No conjunctival injection.   ENT: No discharge. Pharynx clear.   Neck: Trachea midline. Normal thyroid.   Resp: No accessory muscle use. No crackles. No wheezes. No rhonchi. No dullness on percussion.  CV: Regular rate. Regular rhythm. No murmur or  rub. No edema.   GI: Non-tender. Non-distended. No masses. No organomegaly. Normal bowel sounds. No hernia.   Skin: Warm and dry. No nodule on exposed extremities. No rash on exposed extremities.   Lymph: No cervical LAD. No supraclavicular LAD.   M/S: No cyanosis. No joint deformity. No clubbing.   Neuro: Awake. Moves all 4 extremities, non focal  Psych: Oriented x 3. No anxiety or agitation.           DESIREE Brown       Medications:  valsartan, 160 mg, Daily  sodium chloride flush, 5-40 mL, 2 times per day  aspirin, 81 mg, Daily  atorvastatin, 40 mg, Nightly  enoxaparin, 30 mg, BID  pantoprazole, 40 mg, QAM AC  rosuvastatin, 40 mg, Nightly  hydroCHLOROthiazide, 25 mg, Daily      PRN Medications:  sodium chloride flush, 5-40 mL, PRN  sodium chloride, , PRN  potassium chloride, 40 mEq, PRN   Or  potassium alternative oral replacement, 40 mEq, PRN   Or  potassium chloride, 10 mEq, PRN  magnesium sulfate, 2,000 mg, PRN  ondansetron, 4 mg, Q8H PRN   Or  ondansetron, 4 mg, Q6H PRN  acetaminophen, 650 mg, Q6H PRN   Or  acetaminophen, 650 mg, Q6H PRN  polyethylene glycol, 17 g, Daily PRN  hydrALAZINE, 10 mg, Q6H PRN          Data:  CBC:   Recent Labs     08/22/24  1623   WBC 6.0   HGB 14.8   HCT 42.8   MCV 87.9        BMP:   Recent Labs     08/22/24  1623   *   K 4.0   CL 99   CO2 26   BUN 12   CREATININE 0.9     LIVER PROFILE:   Recent Labs     08/22/24  1623   AST 21   ALT 16   LIPASE 25.0   BILITOT 0.3   ALKPHOS 87     PT/INR: No results for input(s): \"PROTIME\", \"INR\" in the last 72 hours.    Lab Results   Component Value Date/Time    TROPHS 12 08/22/2024 07:28 PM    TROPHS 12 08/22/2024 05:37 PM    TROPHS 11 08/22/2024 04:23 PM        CULTURES  Results       ** No results found for the last 336 hours. **            EKG        RADIOLOGY  CTA CHEST ABDOMEN PELVIS W CONTRAST   Final Result   1. No evidence of thoracic or abdominal aortic aneurysm or dissection.   2. No acute cardiopulmonary process.    3. No acute intra-abdominal or pelvic abnormality.   4. Fat containing umbilical hernia.         CT HEAD WO CONTRAST   Final Result   Diffuse moderate cerebral cortical atrophy and mild cerebellar atrophy which   is unchanged.  There are mild chronic microischemic changes the deep white   matter with no acute intracranial abnormality seen.      Moderate chronic sinusitis which is more prominent.         XR CHEST PORTABLE   Final Result   Borderline cardiomegaly and mild central pulmonary congestion which is more   prominent.      Mild bibasilar atelectasis vs early infiltrates or mild pulmonary edema which   is more apparent.               Assessment/Plan:  Chest pain  - Continue aspirin and Crestor   - EKG without any acute ischemic changes   - CXR shows borderline cardiomegaly and mild central pulmonary congestion  - CTA chest/abd/pelvis shows no acute cardiopulmonary process   - Trend troponins: Negative x3    - echo ordered  - PRN symptom control   - cardiology consulted  - stress test ordered.    HTN - uncontrolled   - continue home losartan-HCTZ  - prn IV hydralazine   - monitor BP    HLD  - on statin     COPD/asthma no AE  - continue home inhalers    GERD  - on PPI    ELPIDIO - has not been able to use for the last few months due to machine not working   - resume home BiPAP.    Morbid Obesity  - Body mass index is 41.61 kg/m².  - Complicating assessment and treatment. Placing patient at risk for multiple co-morbidities as well as early death and contributing to the patient's presentation.   - Counseled on weight loss.     DVT Prophylaxis: Lovenox   Diet: Diet NPO  Code Status: Full Code    Nicole Marlo FNP-C  8/23/2024      ONEIL Brown.

## 2024-08-23 NOTE — PROGRESS NOTES
Patient being transferred to room 235 for admission. Handoff report given to BRANDY Gonzalez at bedside. Discussed SBAR, IV access, pain management, Skin, mobility, diet & Consults. All questions answered. At the time of transfer patient awake, alert, VSS, Respirations easy unlabored. Denies pain. Transported via W/C with all belongings.
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
## 2024-08-23 NOTE — PROGRESS NOTES
BP (!) 165/105   Pulse 76   Temp 97.6 °F (36.4 °C) (Oral)   Resp 18   Ht 1.778 m (5' 10\")   Wt 131.5 kg (290 lb)   SpO2 94%   BMI 41.61 kg/m²     Assessment complete. Meds passed. Pt denies needs at this time. OFF to Stress lab now, planning 2part stress test. Ok to eat after, NPO at midnight with no caffeine.         Bedside Mobility Assessment Tool (BMAT):     Assessment Level 1- Sit and Shake    1. From a semi-reclined position, ask patient to sit up and rotate to a seated position at the side of the bed. Can use the bedrail.    2. Ask patient to reach out and grab your hand and shake making sure patient reaches across his/her midline.   Pass- Patient is able to come to a seated position, maintain core strength. Maintains seated balance while reaching across midline. Move on to Assessment Level 2.     Assessment Level 2- Stretch and Point   1. With patient in seated position at the side of the bed, have patient place both feet on the floor (or stool) with knees no higher than hips.    2. Ask patient to stretch one leg and straighten the knee, then bend the ankle/flex and point the toes. If appropriate, repeat with the other leg.   Pass- Patient is able to demonstrate appropriate quad strength on intended weight bearing limb(s). Move onto Assessment Level 3.     Assessment Level 3- Stand   1. Ask patient to elevate off the bed or chair (seated to standing) using an assistive device (cane, bedrail).    2. Patient should be able to raise buttocks off be and hold for a count of five. May repeat once.   Pass- Patient maintains standing stability for at least 5 seconds, proceed to assessment level 4.    Assessment Level 4- Walk   1. Ask patient to march in place at bedside.    2. Then ask patient to advance step and return each foot. Some medical conditions may render a patient from stepping backwards, use your best clinical judgement.   Pass- Patient demonstrates balance while shifting weight and ability to

## 2024-08-24 ENCOUNTER — APPOINTMENT (OUTPATIENT)
Dept: NUCLEAR MEDICINE | Age: 69
End: 2024-08-24
Payer: MEDICARE

## 2024-08-24 VITALS
RESPIRATION RATE: 18 BRPM | WEIGHT: 290 LBS | TEMPERATURE: 97.5 F | HEART RATE: 62 BPM | DIASTOLIC BLOOD PRESSURE: 73 MMHG | SYSTOLIC BLOOD PRESSURE: 153 MMHG | OXYGEN SATURATION: 93 % | HEIGHT: 70 IN | BODY MASS INDEX: 41.52 KG/M2

## 2024-08-24 PROBLEM — E66.01 MORBID OBESITY (HCC): Status: ACTIVE | Noted: 2024-08-24

## 2024-08-24 LAB
ANION GAP SERPL CALCULATED.3IONS-SCNC: 7 MMOL/L (ref 3–16)
BUN SERPL-MCNC: 15 MG/DL (ref 7–20)
CALCIUM SERPL-MCNC: 9 MG/DL (ref 8.3–10.6)
CHLORIDE SERPL-SCNC: 101 MMOL/L (ref 99–110)
CO2 SERPL-SCNC: 26 MMOL/L (ref 21–32)
CREAT SERPL-MCNC: 0.9 MG/DL (ref 0.8–1.3)
DEPRECATED RDW RBC AUTO: 14.7 % (ref 12.4–15.4)
ECHO BSA: 2.55 M2
GFR SERPLBLD CREATININE-BSD FMLA CKD-EPI: >90 ML/MIN/{1.73_M2}
GLUCOSE SERPL-MCNC: 103 MG/DL (ref 70–99)
HCT VFR BLD AUTO: 44.4 % (ref 40.5–52.5)
HGB BLD-MCNC: 14.9 G/DL (ref 13.5–17.5)
MCH RBC QN AUTO: 30.3 PG (ref 26–34)
MCHC RBC AUTO-ENTMCNC: 33.6 G/DL (ref 31–36)
MCV RBC AUTO: 90 FL (ref 80–100)
NUC STRESS EJECTION FRACTION: 60 %
NUC STRESS LV EDV: 102 ML (ref 67–155)
NUC STRESS LV ESV: 41 ML (ref 22–58)
NUC STRESS LV MASS: 135 G
PLATELET # BLD AUTO: 154 K/UL (ref 135–450)
PMV BLD AUTO: 8.3 FL (ref 5–10.5)
POTASSIUM SERPL-SCNC: 4.5 MMOL/L (ref 3.5–5.1)
RBC # BLD AUTO: 4.93 M/UL (ref 4.2–5.9)
SODIUM SERPL-SCNC: 134 MMOL/L (ref 136–145)
STRESS BASELINE DIAS BP: 52 MMHG
STRESS BASELINE HR: 75 BPM
STRESS BASELINE SYS BP: 174 MMHG
STRESS ESTIMATED WORKLOAD: 1 METS
STRESS PEAK DIAS BP: 89 MMHG
STRESS PEAK SYS BP: 201 MMHG
STRESS PERCENT HR ACHIEVED: 71 %
STRESS POST PEAK HR: 108 BPM
STRESS RATE PRESSURE PRODUCT: NORMAL BPM*MMHG
STRESS TARGET HR: 153 BPM
WBC # BLD AUTO: 6 K/UL (ref 4–11)

## 2024-08-24 PROCEDURE — 2700000000 HC OXYGEN THERAPY PER DAY

## 2024-08-24 PROCEDURE — 80048 BASIC METABOLIC PNL TOTAL CA: CPT

## 2024-08-24 PROCEDURE — 6370000000 HC RX 637 (ALT 250 FOR IP): Performed by: STUDENT IN AN ORGANIZED HEALTH CARE EDUCATION/TRAINING PROGRAM

## 2024-08-24 PROCEDURE — 2580000003 HC RX 258: Performed by: STUDENT IN AN ORGANIZED HEALTH CARE EDUCATION/TRAINING PROGRAM

## 2024-08-24 PROCEDURE — 3430000000 HC RX DIAGNOSTIC RADIOPHARMACEUTICAL: Performed by: INTERNAL MEDICINE

## 2024-08-24 PROCEDURE — 93018 CV STRESS TEST I&R ONLY: CPT | Performed by: INTERNAL MEDICINE

## 2024-08-24 PROCEDURE — 85027 COMPLETE CBC AUTOMATED: CPT

## 2024-08-24 PROCEDURE — 99233 SBSQ HOSP IP/OBS HIGH 50: CPT | Performed by: INTERNAL MEDICINE

## 2024-08-24 PROCEDURE — 36415 COLL VENOUS BLD VENIPUNCTURE: CPT

## 2024-08-24 PROCEDURE — 99238 HOSP IP/OBS DSCHRG MGMT 30/<: CPT | Performed by: INTERNAL MEDICINE

## 2024-08-24 PROCEDURE — 93016 CV STRESS TEST SUPVJ ONLY: CPT | Performed by: INTERNAL MEDICINE

## 2024-08-24 PROCEDURE — G0378 HOSPITAL OBSERVATION PER HR: HCPCS

## 2024-08-24 PROCEDURE — 94761 N-INVAS EAR/PLS OXIMETRY MLT: CPT

## 2024-08-24 PROCEDURE — 78452 HT MUSCLE IMAGE SPECT MULT: CPT | Performed by: INTERNAL MEDICINE

## 2024-08-24 PROCEDURE — A9502 TC99M TETROFOSMIN: HCPCS | Performed by: INTERNAL MEDICINE

## 2024-08-24 PROCEDURE — 6370000000 HC RX 637 (ALT 250 FOR IP): Performed by: EMERGENCY MEDICINE

## 2024-08-24 PROCEDURE — 94660 CPAP INITIATION&MGMT: CPT

## 2024-08-24 RX ADMIN — Medication 10 ML: at 09:22

## 2024-08-24 RX ADMIN — ASPIRIN 81 MG: 81 TABLET, CHEWABLE ORAL at 09:20

## 2024-08-24 RX ADMIN — TETROFOSMIN 30.1 MILLICURIE: 1.38 INJECTION, POWDER, LYOPHILIZED, FOR SOLUTION INTRAVENOUS at 07:54

## 2024-08-24 RX ADMIN — VALSARTAN 160 MG: 80 TABLET, FILM COATED ORAL at 09:20

## 2024-08-24 RX ADMIN — HYDROCHLOROTHIAZIDE 25 MG: 25 TABLET ORAL at 09:20

## 2024-08-24 ASSESSMENT — ENCOUNTER SYMPTOMS
WHEEZING: 0
HEMATOCHEZIA: 0
SLEEP DISTURBANCES DUE TO BREATHING: 1
RIGHT EYE: 0
HEMATEMESIS: 0
LEFT EYE: 0
SHORTNESS OF BREATH: 1
STRIDOR: 0

## 2024-08-24 NOTE — PROGRESS NOTES
See pm shift assess and vitals.  Denies cp; denies sob.  Tele showing  NSR.  Reminded of NPO after MN; voiced understanding.  Call light in reach.

## 2024-08-24 NOTE — PROGRESS NOTES
BP (!) 153/73   Pulse 62   Temp 97.5 °F (36.4 °C) (Oral)   Resp 18   Ht 1.778 m (5' 10\")   Wt 131.5 kg (290 lb)   SpO2 93%   BMI 41.61 kg/m²     Assessment complete. Meds passed. Pt denies needs at this time. Stress performed, now ok to dc.         Bedside Mobility Assessment Tool (BMAT):     Assessment Level 1- Sit and Shake    1. From a semi-reclined position, ask patient to sit up and rotate to a seated position at the side of the bed. Can use the bedrail.    2. Ask patient to reach out and grab your hand and shake making sure patient reaches across his/her midline.   Pass- Patient is able to come to a seated position, maintain core strength. Maintains seated balance while reaching across midline. Move on to Assessment Level 2.     Assessment Level 2- Stretch and Point   1. With patient in seated position at the side of the bed, have patient place both feet on the floor (or stool) with knees no higher than hips.    2. Ask patient to stretch one leg and straighten the knee, then bend the ankle/flex and point the toes. If appropriate, repeat with the other leg.   Pass- Patient is able to demonstrate appropriate quad strength on intended weight bearing limb(s). Move onto Assessment Level 3.     Assessment Level 3- Stand   1. Ask patient to elevate off the bed or chair (seated to standing) using an assistive device (cane, bedrail).    2. Patient should be able to raise buttocks off be and hold for a count of five. May repeat once.   Pass- Patient maintains standing stability for at least 5 seconds, proceed to assessment level 4.    Assessment Level 4- Walk   1. Ask patient to march in place at bedside.    2. Then ask patient to advance step and return each foot. Some medical conditions may render a patient from stepping backwards, use your best clinical judgement.   Pass- Patient demonstrates balance while shifting weight and ability to step, takes independent steps, does not use assistive device patient is

## 2024-08-24 NOTE — PROGRESS NOTES
08/23/24 2313   NIV Type   Equipment Type v60   Mode CPAP   Mask Type Full face mask   Mask Size Medium   Settings/Measurements   CPAP/EPAP 17 cmH2O   Vt (Measured) 612 mL   FiO2  40 %   Minute Volume (L/min) 5 Liters   Mask Leak (lpm) 4 lpm   Patient's Home Machine No   Patient Observation   Patient Observations spo2 97% on 40% cpap

## 2024-08-24 NOTE — DISCHARGE INSTR - COC
Continuity of Care Form    Patient Name: Andrew Mcadams   :  1955  MRN:  [id number]    Admit date:  2024  Discharge date:  ***    Code Status Order: Full Code   Advance Directives:   Advance Care Flowsheet Documentation             Admitting Physician:  Esteban Dickey MD  PCP: Pura Ceballos MD    Discharging Nurse: ***  Discharging Hospital Unit/Room#: 0235/0235-02  Discharging Unit Phone Number: ***    Emergency Contact:   Extended Emergency Contact Information  Primary Emergency Contact: Zabrina Mcadams  Address: [facility] [address] [place] [address] of [place]  Home Phone: [phone]  Mobile Phone: [phone]  Relation: Spouse    Past Surgical History:  Past Surgical History:   Procedure Laterality Date    EYE MUSCLE SURGERY      as child    OTHER SURGICAL HISTORY      PE tubes bilat.    UPPER GASTROINTESTINAL ENDOSCOPY      Approx 9 years ago    UPPER GASTROINTESTINAL ENDOSCOPY  2012    foreign body    UPPER GASTROINTESTINAL ENDOSCOPY N/A 3/13/2023    EGD FOREIGN BODY REMOVAL performed by Malik Manley MD at [facility] END[facility]       Immunization History:   Immunization History   Administered Date(s) Administered    COVID-19, PFIZER PURPLE top, DILUTE for use, (age 12 y+), 30mcg/0.3mL 2021, 2021, 2021    Influenza Virus Vaccine 10/01/2018, 2018, 10/02/2019    Influenza, FLUARIX, FLULAVAL, FLUZONE (age 6 mo+) and AFLURIA, (age 3 y+), Quadv PF, 0.5mL 2018    Influenza, FLUBLOK, (age 18 y+), Quadv PF, 0.5mL 10/09/2019    Td, unspecified formulation 2014       Active Problems:  Patient Active Problem List   Diagnosis Code    Essential hypertension I10    GERD with stricture K21.9, K22.2    Hearing loss H91.90    Hyperlipidemia E78.5    BMI 40.0-44.9, adult (HCC) Z68.41    ELPIDIO (obstructive sleep apnea) G47.33    Obesity (BMI 30-39.9) E66.9    Chest pain R07.9    Morbid obesity (HCC) E66.01       Isolation/Infection:    Isolation            No Isolation          Patient Infection Status       None to display            Nurse Assessment:  Last Vital Signs: BP (!) 153/73   Pulse 62   Temp 97.5 °F (36.4 °C) (Oral)   Resp 18   Ht 1.778 m (5' 10\")   Wt 131.5 kg (290 lb)   SpO2 93%   BMI 41.61 kg/m²     Last documented pain score (0-10 scale):    Last Weight:   Wt Readings from Last 1 Encounters:   08/23/24 131.5 kg (290 lb)     Mental Status:  {IP PT MENTAL STATUS:20030}    IV Access:  { WENDY IV ACCESS:707670269}    Nursing Mobility/ADLs:  Walking   {CHP DME ADLs:677647652}  Transfer  {CHP DME ADLs:690786303}  Bathing  {CHP DME ADLs:020827687}  Dressing  {CHP DME ADLs:238260392}  Toileting  {CHP DME ADLs:994974885}  Feeding  {CHP DME ADLs:091001782}  Med Admin  {P DME ADLs:476194316}  Med Delivery   { WENDY MED Delivery:116404452}    Wound Care Documentation and Therapy:        Elimination:  Continence:   Bowel: {YES / NO:19727}  Bladder: {YES / NO:19727}  Urinary Catheter: {Urinary Catheter:047127737}   Colostomy/Ileostomy/Ileal Conduit: {YES / NO:19727}       Date of Last BM: ***    Intake/Output Summary (Last 24 hours) at 8/24/2024 1216  Last data filed at 8/24/2024 0850  Gross per 24 hour   Intake 480 ml   Output --   Net 480 ml     I/O last 3 completed shifts:  In: 240 [P.O.:240]  Out: -     Safety Concerns:     { WENDY Safety Concerns:873461079}    Impairments/Disabilities:      { WENDY Impairments/Disabilities:481768951}    Nutrition Therapy:  Current Nutrition Therapy:   { WENDY Diet List:341659436}    Routes of Feeding: {CHP DME Other Feedings:455828334}  Liquids: {Slp liquid thickness:25311}  Daily Fluid Restriction: {CHP DME Yes amt example:616323855}  Last Modified Barium Swallow with Video (Video Swallowing Test): {Done Not Done Date:304088012}    Treatments at the Time of Hospital Discharge:   Respiratory Treatments: ***  Oxygen Therapy:  {Therapy; copd oxygen:45590}  Ventilator:    { CC Vent  List:475831932}    Rehab Therapies: {THERAPEUTIC INTERVENTION:7075605486}  Weight Bearing Status/Restrictions: { CC Weight Bearin}  Other Medical Equipment (for information only, NOT a DME order):  {EQUIPMENT:813493268}  Other Treatments: ***    Patient's personal belongings (please select all that are sent with patient):  {CHP DME Belongings:747066858}    RN SIGNATURE:  {Esignature:553491382}    CASE MANAGEMENT/SOCIAL WORK SECTION    Inpatient Status Date: ***    Readmission Risk Assessment Score:  Readmission Risk              Risk of Unplanned Readmission:  0           Discharging to Facility/ Agency   Name:   Address:  Phone:  Fax:    Dialysis Facility (if applicable)   Name:  Address:  Dialysis Schedule:  Phone:  Fax:    / signature: {Esignature:207163253}    PHYSICIAN SECTION    Prognosis: {Prognosis:6588125679}    Condition at Discharge: { Patient Condition:068425968}    Rehab Potential (if transferring to Rehab): {Prognosis:6834141271}    Recommended Labs or Other Treatments After Discharge: ***    Physician Certification: I certify the above information and transfer of Andrew Mcadams  is necessary for the continuing treatment of the diagnosis listed and that he requires {Admit to Appropriate Level of Care:18121} for {GREATER/LESS:715333055} 30 days.     Update Admission H&P: {CHP DME Changes in HandP:771966208}    PHYSICIAN SIGNATURE:  {Esignature:292732561}
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
## 2024-08-24 NOTE — PROGRESS NOTES
Cardiac Electrophysiology Progress Note      Assessment:     1. Chest pain: resolved. Atypical features overall. Underwent myocardial perfusion scan with no significant abnormalities noted. Echocardiogram also OK. Patient feels well today. OK for discharge from cardiology standpoint with outpatient follow up (he has a cardiologist already).    2. ELPIDIO: compliance with CPAP therapy strongly recommended.     3. Hypertension: labile blood pressure values past 24 hours. Continue current medications and follow up with PCP and primary cardiologist.     Plan:     1. Continue current medications  2. Follow up with primary cardiologist upon discharge  3. No further inpatient testing or treatment required from cardiology standpoint.     Subjective:     Interval History:    Feels much better. No more chest pain. Breathing OK. No dizziness or palpitations.     Problem List:  Patient Active Problem List   Diagnosis    HTN (hypertension)    GERD with stricture    Hearing loss    Hyperlipidemia    BMI 40.0-44.9, adult (HCC)    Severe obstructive sleep apnea    Obesity (BMI 30-39.9)    Chest pain       History:  Past Medical History:   Diagnosis Date    Asthma 09/2023    COPD (chronic obstructive pulmonary disease) (HCC)     Hearing loss 2/2016    Histoplasmosis     2000    HLD (hyperlipidemia)     Hypertension     Irritable bowel syndrome     Osteoarthritis 3/2018    Sleep apnea          Review of Systems:  Review of Systems   Constitutional: Negative for malaise/fatigue, weight gain and weight loss.   HENT:  Negative for nosebleeds and stridor.    Eyes:  Negative for vision loss in left eye and vision loss in right eye.   Cardiovascular:  Negative for chest pain, dyspnea on exertion, leg swelling and syncope.   Respiratory:  Positive for shortness of breath and sleep disturbances due to breathing. Negative for wheezing.    Hematologic/Lymphatic: Negative for bleeding problem. Does not bruise/bleed easily.   Skin:  Negative for  defect, likely artifact but cannot rule out a very small area of ischemia.  No evidence of ischemia in the remaining myocardial segments.    Stress Function: Left ventricular function post-stress is normal. Post-stress ejection fraction is 60%. The stress end diastolic cavity size is normal. Stress end diastolic volume: 102 mL. Stress end systolic volume: 41 mL. LV mass: 135.0 g.    Resting ECG: The ECG shows sinus rhythm. ECG demonstrates right bundle branch block.    Stress ECG: The stress ECG was not diagnostic due to failure to achieve target heart rate.    Stress Test: A pharmacological stress test was performed using regadenoson (Lexiscan). PO caffeine given as a reversal agent.    Echocardiogram: (2024)      Left Ventricle: Normal left ventricular systolic function with a visually estimated EF of 55 - 60%. Left ventricle size is normal. Mildly increased wall thickness. Findings consistent with eccentric remodeling. Normal wall motion. Grade I diastolic dysfunction with normal LAP.    Mitral Valve: Mildly thickened, at the anterior leaflet.    Image quality is adequate.    Aortic valve sclerosis but without stenosis.    Lab Review:      CBC:   Recent Labs     08/22/24  1623 08/23/24  0800 08/24/24  0607   WBC 6.0 6.5 6.0   HGB 14.8 14.2 14.9   HCT 42.8 42.7 44.4   MCV 87.9 90.2 90.0    151 154       BMP:   Recent Labs     08/22/24  1623 08/23/24  0800 08/24/24  0607   * 134* 134*   K 4.0 4.6 4.5   CL 99 100 101   CO2 26 25 26   BUN 12 15 15   CREATININE 0.9 1.0 0.9       LIVER PROFILE:   Recent Labs     08/22/24  1623   AST 21   ALT 16   LIPASE 25.0   BILITOT 0.3   ALKPHOS 87             Signed by: Sudhakar Mack MD

## 2024-09-20 ENCOUNTER — TELEPHONE (OUTPATIENT)
Dept: PULMONOLOGY | Age: 69
End: 2024-09-20

## 2024-09-20 NOTE — TELEPHONE ENCOUNTER
Called patient to reschedule appointment that was on the 8th and now he is schedule for 01/02 9:15 am. Patient ask if he needed to get a CT done but there was not one schedule at this time. Patient would like to know if he needs one schedule?

## 2024-11-11 ENCOUNTER — TELEPHONE (OUTPATIENT)
Dept: PRIMARY CARE CLINIC | Age: 69
End: 2024-11-11

## 2024-11-14 ENCOUNTER — APPOINTMENT (OUTPATIENT)
Dept: GENERAL RADIOLOGY | Age: 69
End: 2024-11-14
Payer: MEDICARE

## 2024-11-14 ENCOUNTER — HOSPITAL ENCOUNTER (EMERGENCY)
Age: 69
Discharge: HOME | End: 2024-11-14
Payer: MEDICARE

## 2024-11-14 VITALS
OXYGEN SATURATION: 94 % | TEMPERATURE: 97.9 F | DIASTOLIC BLOOD PRESSURE: 85 MMHG | RESPIRATION RATE: 18 BRPM | SYSTOLIC BLOOD PRESSURE: 162 MMHG | BODY MASS INDEX: 41.32 KG/M2 | HEART RATE: 69 BPM | WEIGHT: 288 LBS

## 2024-11-14 DIAGNOSIS — J06.9 ACUTE UPPER RESPIRATORY INFECTION: ICD-10-CM

## 2024-11-14 DIAGNOSIS — J44.1 COPD EXACERBATION (HCC): Primary | ICD-10-CM

## 2024-11-14 LAB
ALBUMIN SERPL-MCNC: 3.6 G/DL (ref 3.4–5)
ALBUMIN/GLOB SERPL: 1.2 {RATIO} (ref 1.1–2.2)
ALP SERPL-CCNC: 78 U/L (ref 40–129)
ALT SERPL-CCNC: 23 U/L (ref 10–40)
ANION GAP SERPL CALCULATED.3IONS-SCNC: 8 MMOL/L (ref 3–16)
AST SERPL-CCNC: 32 U/L (ref 15–37)
BASOPHILS # BLD: 0.1 K/UL (ref 0–0.2)
BASOPHILS NFR BLD: 1.2 %
BILIRUB SERPL-MCNC: 0.3 MG/DL (ref 0–1)
BUN SERPL-MCNC: 20 MG/DL (ref 7–20)
CALCIUM SERPL-MCNC: 8.6 MG/DL (ref 8.3–10.6)
CHLORIDE SERPL-SCNC: 99 MMOL/L (ref 99–110)
CO2 SERPL-SCNC: 26 MMOL/L (ref 21–32)
CREAT SERPL-MCNC: 1.1 MG/DL (ref 0.8–1.3)
DEPRECATED RDW RBC AUTO: 13.3 % (ref 12.4–15.4)
EKG ATRIAL RATE: 84 BPM
EKG DIAGNOSIS: NORMAL
EKG P AXIS: 47 DEGREES
EKG P-R INTERVAL: 156 MS
EKG Q-T INTERVAL: 398 MS
EKG QRS DURATION: 136 MS
EKG QTC CALCULATION (BAZETT): 470 MS
EKG R AXIS: -36 DEGREES
EKG T AXIS: 25 DEGREES
EKG VENTRICULAR RATE: 84 BPM
EOSINOPHIL # BLD: 0.4 K/UL (ref 0–0.6)
EOSINOPHIL NFR BLD: 6.2 %
FLUAV RNA RESP QL NAA+PROBE: NOT DETECTED
FLUBV RNA RESP QL NAA+PROBE: NOT DETECTED
GFR SERPLBLD CREATININE-BSD FMLA CKD-EPI: 73 ML/MIN/{1.73_M2}
GLUCOSE SERPL-MCNC: 81 MG/DL (ref 70–99)
HCT VFR BLD AUTO: 41 % (ref 40.5–52.5)
HGB BLD-MCNC: 14.4 G/DL (ref 13.5–17.5)
LYMPHOCYTES # BLD: 0.9 K/UL (ref 1–5.1)
LYMPHOCYTES NFR BLD: 14.6 %
MAGNESIUM SERPL-MCNC: 1.61 MG/DL (ref 1.8–2.4)
MCH RBC QN AUTO: 31.6 PG (ref 26–34)
MCHC RBC AUTO-ENTMCNC: 35 G/DL (ref 31–36)
MCV RBC AUTO: 90.2 FL (ref 80–100)
MONOCYTES # BLD: 0.6 K/UL (ref 0–1.3)
MONOCYTES NFR BLD: 9.9 %
NEUTROPHILS # BLD: 4 K/UL (ref 1.7–7.7)
NEUTROPHILS NFR BLD: 68.1 %
PLATELET # BLD AUTO: 168 K/UL (ref 135–450)
PMV BLD AUTO: 7.8 FL (ref 5–10.5)
POTASSIUM SERPL-SCNC: 3.9 MMOL/L (ref 3.5–5.1)
PROT SERPL-MCNC: 6.7 G/DL (ref 6.4–8.2)
RBC # BLD AUTO: 4.55 M/UL (ref 4.2–5.9)
SARS-COV-2 RNA RESP QL NAA+PROBE: NOT DETECTED
SODIUM SERPL-SCNC: 133 MMOL/L (ref 136–145)
TROPONIN, HIGH SENSITIVITY: 12 NG/L (ref 0–22)
WBC # BLD AUTO: 5.8 K/UL (ref 4–11)

## 2024-11-14 PROCEDURE — 6360000002 HC RX W HCPCS: Performed by: PHYSICIAN ASSISTANT

## 2024-11-14 PROCEDURE — 83735 ASSAY OF MAGNESIUM: CPT

## 2024-11-14 PROCEDURE — 93005 ELECTROCARDIOGRAM TRACING: CPT | Performed by: EMERGENCY MEDICINE

## 2024-11-14 PROCEDURE — 99285 EMERGENCY DEPT VISIT HI MDM: CPT

## 2024-11-14 PROCEDURE — 93010 ELECTROCARDIOGRAM REPORT: CPT | Performed by: INTERNAL MEDICINE

## 2024-11-14 PROCEDURE — 96374 THER/PROPH/DIAG INJ IV PUSH: CPT

## 2024-11-14 PROCEDURE — 85025 COMPLETE CBC W/AUTO DIFF WBC: CPT

## 2024-11-14 PROCEDURE — 87636 SARSCOV2 & INF A&B AMP PRB: CPT

## 2024-11-14 PROCEDURE — 84484 ASSAY OF TROPONIN QUANT: CPT

## 2024-11-14 PROCEDURE — 80053 COMPREHEN METABOLIC PANEL: CPT

## 2024-11-14 PROCEDURE — 71045 X-RAY EXAM CHEST 1 VIEW: CPT

## 2024-11-14 PROCEDURE — 36415 COLL VENOUS BLD VENIPUNCTURE: CPT

## 2024-11-14 PROCEDURE — 2580000003 HC RX 258: Performed by: PHYSICIAN ASSISTANT

## 2024-11-14 RX ORDER — PREDNISONE 20 MG/1
TABLET ORAL
Qty: 20 TABLET | Refills: 0 | Status: SHIPPED | OUTPATIENT
Start: 2024-11-14 | End: 2024-11-26

## 2024-11-14 RX ORDER — ALBUTEROL SULFATE 0.83 MG/ML
2.5 SOLUTION RESPIRATORY (INHALATION) ONCE
Status: COMPLETED | OUTPATIENT
Start: 2024-11-14 | End: 2024-11-14

## 2024-11-14 RX ORDER — CODEINE PHOSPHATE/GUAIFENESIN 10-100MG/5
5 LIQUID (ML) ORAL 2 TIMES DAILY PRN
Qty: 30 ML | Refills: 0 | Status: SHIPPED | OUTPATIENT
Start: 2024-11-14 | End: 2024-11-17

## 2024-11-14 RX ADMIN — ALBUTEROL SULFATE 2.5 MG: 2.5 SOLUTION RESPIRATORY (INHALATION) at 11:14

## 2024-11-14 RX ADMIN — WATER 125 MG: 1 INJECTION INTRAMUSCULAR; INTRAVENOUS; SUBCUTANEOUS at 12:20

## 2024-11-14 ASSESSMENT — PAIN - FUNCTIONAL ASSESSMENT: PAIN_FUNCTIONAL_ASSESSMENT: 0-10

## 2024-11-14 ASSESSMENT — PAIN SCALES - GENERAL: PAINLEVEL_OUTOF10: 4

## 2024-11-14 ASSESSMENT — PAIN DESCRIPTION - DESCRIPTORS: DESCRIPTORS: ACHING

## 2024-11-14 ASSESSMENT — PAIN DESCRIPTION - PAIN TYPE: TYPE: ACUTE PAIN

## 2024-11-14 ASSESSMENT — PAIN DESCRIPTION - LOCATION: LOCATION: CHEST

## 2024-11-14 NOTE — DISCHARGE INSTRUCTIONS
Take steroid taper as prescribed.  Use your albuterol every 4-6 hours while awake for as long as you are taking the steroids or until symptoms resolve.  Follow-up with family doctor.  Call first thing in the morning or when you get home to set up appointment.  Return immediately for worsening shortness of breath, new complaints, or worsening.  Take over-the-counter Sudafed and your choice of antihistamines-Benadryl, Claritin, Tavist, Allegra, or Zyrtec.  May try over-the-counter Delsym or similar cough medicine.  May try over-the-counter Mucinex/guaifenesin unless taking the prescribed cough medicine.  Do not drive, work, or operate machinery while taking prescribed cough medicine.

## 2024-11-14 NOTE — ED PROVIDER NOTES
discharge home.  This was discussed with the patient.  In shared decision making, patient will be discharged with emphasis on outpatient follow-up.  Patient was given Solu-Medrol here and will be given a prednisone taper along with instructions to use his albuterol and to treat symptomatically with over-the-counter medications.  Guidelines prompting return were provided.  Expectant guidance was provided.    Disposition Considerations (tests considered but not done, Admit vs D/C, Shared Decision Making, Pt Expectation of Test or Tx.):      The patient tolerated their visit well.  The patient and / or the family were informed of the results of any tests, a time was given to answer questions, a plan was proposed and they agreed with plan.    I am the Primary Clinician of Record.  FINAL IMPRESSION      1. COPD exacerbation (HCC)    2. Acute upper respiratory infection          DISPOSITION/PLAN     DISPOSITION Decision To Discharge 11/14/2024 01:31:45 PM           PATIENT REFERRED TO:  Hui Borges, DO  [address] [facility] [address] [address]  [phone]            DISCHARGE MEDICATIONS:  Discharge Medication List as of 11/14/2024  2:01 PM        START taking these medications    Details   predniSONE (DELTASONE) 20 MG tablet Take 3 tablets by mouth daily for 3 days, THEN 2 tablets daily for 3 days, THEN 1 tablet daily for 3 days, THEN 0.5 tablets daily for 3 days., Disp-20 tablet, R-0Normal      guaiFENesin-codeine (TUSSI-ORGANIDIN NR) 100-10 MG/5ML syrup Take 5 mLs by mouth 2 times daily as needed for Cough for up to 3 days. Max Daily Amount: 10 mLs, Disp-30 mL, R-0Print             DISCONTINUED MEDICATIONS:  Discharge Medication List as of 11/14/2024  2:01 PM                 (Please note that portions of this note were completed with a voice recognition program.  Efforts were made to edit the dictations but occasionally words are mis-transcribed.)    Warren Goodman PA-C (electronically

## 2024-12-16 ENCOUNTER — TELEPHONE (OUTPATIENT)
Dept: PULMONOLOGY | Age: 69
End: 2024-12-16

## 2024-12-16 NOTE — TELEPHONE ENCOUNTER
Patient cancelled appointment on my chart      Appointment canceled for Andrew Mcadams ([id number])  Visit type: FOLLOW UP - PULM  2/27/2025 2:15 PM (15 minutes) with Dr. Tyson Oconnor MD in [facility]     Reason for cancellation: Changed Provider

## 2024-12-28 DIAGNOSIS — J44.9 MODERATE COPD (CHRONIC OBSTRUCTIVE PULMONARY DISEASE) (HCC): ICD-10-CM

## 2024-12-31 DIAGNOSIS — J44.9 MODERATE COPD (CHRONIC OBSTRUCTIVE PULMONARY DISEASE) (HCC): Primary | ICD-10-CM

## 2025-01-01 RX ORDER — ALBUTEROL SULFATE 90 UG/1
2 INHALANT RESPIRATORY (INHALATION) EVERY 4 HOURS PRN
Qty: 18 G | Refills: 6 | Status: SHIPPED | OUTPATIENT
Start: 2025-01-01

## 2025-01-08 DIAGNOSIS — J44.9 MODERATE COPD (CHRONIC OBSTRUCTIVE PULMONARY DISEASE) (HCC): ICD-10-CM

## 2025-01-08 DIAGNOSIS — G47.33 SEVERE OBSTRUCTIVE SLEEP APNEA: Primary | ICD-10-CM

## 2025-01-08 RX ORDER — ALBUTEROL SULFATE 90 UG/1
2 INHALANT RESPIRATORY (INHALATION) EVERY 4 HOURS PRN
Qty: 18 G | Refills: 6 | Status: SHIPPED | OUTPATIENT
Start: 2025-01-08

## 2025-01-18 DIAGNOSIS — I10 PRIMARY HYPERTENSION: ICD-10-CM

## 2025-01-20 RX ORDER — VALSARTAN AND HYDROCHLOROTHIAZIDE 160; 25 MG/1; MG/1
1 TABLET ORAL DAILY
Qty: 90 TABLET | Refills: 0 | Status: SHIPPED | OUTPATIENT
Start: 2025-01-20 | End: 2025-04-20

## 2025-01-20 NOTE — TELEPHONE ENCOUNTER
Refill Request     Last Seen: 6/3/2024    Last Written: Ordered On: 06/03/2024     Next Appointment:   No future appointments.          Requested Prescriptions     Pending Prescriptions Disp Refills    valsartan-hydroCHLOROthiazide (DIOVAN-HCT) 160-25 MG per tablet [Pharmacy Med Name: VALSARTAN-HCTZ 160-25 MG TAB] 90 tablet 1     Sig: TAKE 1 TABLET BY MOUTH DAILY

## 2025-01-21 DIAGNOSIS — I10 PRIMARY HYPERTENSION: ICD-10-CM

## 2025-01-22 RX ORDER — VALSARTAN AND HYDROCHLOROTHIAZIDE 160; 25 MG/1; MG/1
1 TABLET ORAL DAILY
Qty: 90 TABLET | Refills: 0 | OUTPATIENT
Start: 2025-01-22 | End: 2025-04-22

## 2025-05-08 ENCOUNTER — TELEPHONE (OUTPATIENT)
Dept: PRIMARY CARE CLINIC | Age: 70
End: 2025-05-08

## 2025-06-03 ENCOUNTER — OFFICE VISIT (OUTPATIENT)
Dept: PRIMARY CARE CLINIC | Age: 70
End: 2025-06-03
Payer: MEDICARE

## 2025-06-03 VITALS
HEIGHT: 66 IN | BODY MASS INDEX: 45.67 KG/M2 | OXYGEN SATURATION: 96 % | DIASTOLIC BLOOD PRESSURE: 85 MMHG | TEMPERATURE: 97.8 F | HEART RATE: 73 BPM | WEIGHT: 284.2 LBS | SYSTOLIC BLOOD PRESSURE: 145 MMHG

## 2025-06-03 DIAGNOSIS — G47.33 OSA (OBSTRUCTIVE SLEEP APNEA): ICD-10-CM

## 2025-06-03 DIAGNOSIS — K21.9 GERD WITH STRICTURE: ICD-10-CM

## 2025-06-03 DIAGNOSIS — I50.32 CHRONIC HEART FAILURE WITH PRESERVED EJECTION FRACTION (HCC): ICD-10-CM

## 2025-06-03 DIAGNOSIS — I10 ESSENTIAL HYPERTENSION: ICD-10-CM

## 2025-06-03 DIAGNOSIS — K22.2 GERD WITH STRICTURE: ICD-10-CM

## 2025-06-03 DIAGNOSIS — E66.01 MORBID OBESITY (HCC): ICD-10-CM

## 2025-06-03 DIAGNOSIS — Z00.00 MEDICARE ANNUAL WELLNESS VISIT, SUBSEQUENT: Primary | ICD-10-CM

## 2025-06-03 PROCEDURE — 1123F ACP DISCUSS/DSCN MKR DOCD: CPT | Performed by: STUDENT IN AN ORGANIZED HEALTH CARE EDUCATION/TRAINING PROGRAM

## 2025-06-03 PROCEDURE — 3017F COLORECTAL CA SCREEN DOC REV: CPT | Performed by: STUDENT IN AN ORGANIZED HEALTH CARE EDUCATION/TRAINING PROGRAM

## 2025-06-03 PROCEDURE — 1159F MED LIST DOCD IN RCRD: CPT | Performed by: STUDENT IN AN ORGANIZED HEALTH CARE EDUCATION/TRAINING PROGRAM

## 2025-06-03 PROCEDURE — 3077F SYST BP >= 140 MM HG: CPT | Performed by: STUDENT IN AN ORGANIZED HEALTH CARE EDUCATION/TRAINING PROGRAM

## 2025-06-03 PROCEDURE — 3079F DIAST BP 80-89 MM HG: CPT | Performed by: STUDENT IN AN ORGANIZED HEALTH CARE EDUCATION/TRAINING PROGRAM

## 2025-06-03 PROCEDURE — G0439 PPPS, SUBSEQ VISIT: HCPCS | Performed by: STUDENT IN AN ORGANIZED HEALTH CARE EDUCATION/TRAINING PROGRAM

## 2025-06-03 RX ORDER — ALBUTEROL SULFATE 0.83 MG/ML
SOLUTION RESPIRATORY (INHALATION)
Qty: 375 ML | Refills: 5 | Status: SHIPPED | OUTPATIENT
Start: 2025-06-03

## 2025-06-03 RX ORDER — VALSARTAN AND HYDROCHLOROTHIAZIDE 160; 25 MG/1; MG/1
1 TABLET ORAL DAILY
Qty: 90 TABLET | Refills: 0 | Status: SHIPPED | OUTPATIENT
Start: 2025-06-03 | End: 2025-09-01

## 2025-06-03 ASSESSMENT — PATIENT HEALTH QUESTIONNAIRE - PHQ9
SUM OF ALL RESPONSES TO PHQ QUESTIONS 1-9: 0
2. FEELING DOWN, DEPRESSED OR HOPELESS: NOT AT ALL
SUM OF ALL RESPONSES TO PHQ QUESTIONS 1-9: 0
SUM OF ALL RESPONSES TO PHQ QUESTIONS 1-9: 0
1. LITTLE INTEREST OR PLEASURE IN DOING THINGS: NOT AT ALL
SUM OF ALL RESPONSES TO PHQ QUESTIONS 1-9: 0

## 2025-06-03 NOTE — PROGRESS NOTES
Specialists:   Cardiology - Dr. Lewis, [facility]  Pulmonology - Dr. Oconnor but left practice and needs a referral   ENT  - Dr. Lewis, [facility]  Ortho - Dr. Laurent in the past     Retired computer    He is former patient of Dr. Ceballos. He is  to Zabrina Mcadams who is my patient. Family is all nearby. He enjoys playing piano (country [employer])

## 2025-06-03 NOTE — ASSESSMENT & PLAN NOTE
Stable  Compliant with CPAP  Will encourage follow-up with sleep medicine to ensure that titration of CPAP machine is appropriate

## 2025-06-03 NOTE — ASSESSMENT & PLAN NOTE
Noted on echocardiogram completed 8/23  Encouraged and continued use of valsartan, will add on Jardiance for further management  Can consider additional GDMT at follow-up visit

## 2025-06-03 NOTE — ASSESSMENT & PLAN NOTE
Congratulated on efforts so far about losing weight with keto diet  Recommend 150 minutes of cardiovascular activity a week, moderate intensity  Increase intake of fruits and vegetables  Minimize packaged foods

## 2025-06-03 NOTE — ASSESSMENT & PLAN NOTE
BP still slightly elevated today at 145/85  Home blood pressure averaging 140s over mid 80s  Encouraged on increasing hypertension regiment with Jardiance today

## 2025-06-03 NOTE — PATIENT INSTRUCTIONS
For many people, walking is a good choice. Or you may want to swim, bike, or do other activities. Bit by bit, increase the time you're active every day. Try for at least 30 minutes on most days of the week.     Try to quit or cut back on using tobacco and other nicotine products. This includes smoking and vaping. If you need help quitting, talk to your doctor about stop-smoking programs and medicines. These can increase your chances of quitting for good. Quitting is one of the most important things you can do to protect your heart. It is never too late to quit. Try to avoid secondhand smoke too.     Stay at a weight that's healthy for you. Talk to your doctor if you need help losing weight.     Try to get 7 to 9 hours of sleep each night.     Limit alcohol to 2 drinks a day for men and 1 drink a day for women. Too much alcohol can cause health problems.     Manage other health problems such as diabetes, high blood pressure, and high cholesterol. If you think you may have a problem with alcohol or drug use, talk to your doctor.   Medicines    Take your medicines exactly as prescribed. Call your doctor if you think you are having a problem with your medicine.     If your doctor recommends aspirin, take the amount directed each day. Make sure you take aspirin and not another kind of pain reliever, such as acetaminophen (Tylenol).   When should you call for help?   Call 911 if you have symptoms of a heart attack. These may include:    Chest pain or pressure, or a strange feeling in the chest.     Sweating.     Shortness of breath.     Pain, pressure, or a strange feeling in the back, neck, jaw, or upper belly or in one or both shoulders or arms.     Lightheadedness or sudden weakness.     A fast or irregular heartbeat.   After you call 911, the  may tell you to chew 1 adult-strength or 2 to 4 low-dose aspirin. Wait for an ambulance. Do not try to drive yourself.  Watch closely for changes in your health, and be

## 2025-06-03 NOTE — PROGRESS NOTES
Medicare Annual Wellness Visit    Andrew Mcadams is here for Medicare AWV    Assessment & Plan   Medicare annual wellness visit, subsequent  Comments:  Chart reviewed and updated  Anticipatory guidance given  Acute and chronic concerns addressed as  Essential hypertension  Assessment & Plan:  BP still slightly elevated today at 145/85  Home blood pressure averaging 140s over mid 80s  Encouraged on increasing hypertension regiment with Jardiance today  Follow-up in 2 months to reassess  Orders:  -     valsartan-hydroCHLOROthiazide (DIOVAN-HCT) 160-25 MG per tablet; Take 1 tablet by mouth daily, Disp-90 tablet, R-0Normal  -     CBC with Auto Differential; Future  -     Comprehensive Metabolic Panel; Future  -     Lipid Panel; Future  -     Albumin/Creatinine Ratio, Urine; Future  ELPIDIO (obstructive sleep apnea)  Assessment & Plan:  Stable  Compliant with CPAP  Will encourage follow-up with sleep medicine to ensure that titration of CPAP machine is appropriate  Orders:  -     Preston Edwards MD, Sleep Medicine, [facility]  GERD with stricture  Assessment & Plan:  Stable and well-controlled  Continue omeprazole  Chronic heart failure with preserved ejection fraction (HCC)  Assessment & Plan:  Noted on echocardiogram completed 8/23  Encouraged and continued use of valsartan, will add on Jardiance for further management  Can consider additional GDMT at follow-up visit  Orders:  -     empagliflozin (JARDIANCE) 10 MG tablet; Take 1 tablet by mouth daily, Disp-90 tablet, R-0Normal  Morbid obesity (HCC)  Assessment & Plan:  Congratulated on efforts so far about losing weight with keto diet  Recommend 150 minutes of cardiovascular activity a week, moderate intensity  Increase intake of fruits and vegetables  Minimize packaged foods       Return in about 2 months (around 8/18/2025) for Chronic Conditions - BP and Leg Swelling.     Subjective   The following acute and/or chronic problems were also addressed today:  Blood

## (undated) DEVICE — YANKAUER,BULB TIP,W/O VENT,RIGID,STERILE: Brand: MEDLINE

## (undated) DEVICE — CONMED SCOPE SAVER BITE BLOCK, 20X27 MM: Brand: SCOPE SAVER

## (undated) DEVICE — RETRIEVER ENDOSCP L230CM DIA2.5MM NET W3XL5.5CM MIN WRK CHN

## (undated) DEVICE — CANNULA,OXY,ADULT,SUPERSOFT,W/7'TUB,SC: Brand: MEDLINE INDUSTRIES, INC.

## (undated) DEVICE — ENDOSCOPIC KIT 2 12 FT OP4 DE2 GWN SYR